# Patient Record
Sex: MALE | Race: WHITE | NOT HISPANIC OR LATINO | Employment: OTHER | URBAN - NONMETROPOLITAN AREA
[De-identification: names, ages, dates, MRNs, and addresses within clinical notes are randomized per-mention and may not be internally consistent; named-entity substitution may affect disease eponyms.]

---

## 2017-03-14 ENCOUNTER — TRANSCRIBE ORDERS (OUTPATIENT)
Dept: ADMINISTRATIVE | Facility: HOSPITAL | Age: 67
End: 2017-03-14

## 2017-03-14 DIAGNOSIS — R41.3 MEMORY LOSS: Primary | ICD-10-CM

## 2017-03-21 ENCOUNTER — APPOINTMENT (OUTPATIENT)
Dept: CT IMAGING | Facility: HOSPITAL | Age: 67
End: 2017-03-21
Attending: INTERNAL MEDICINE

## 2017-03-21 ENCOUNTER — HOSPITAL ENCOUNTER (OUTPATIENT)
Dept: CT IMAGING | Facility: HOSPITAL | Age: 67
Discharge: HOME | End: 2017-03-21
Attending: INTERNAL MEDICINE | Admitting: INTERNAL MEDICINE

## 2017-03-21 ENCOUNTER — HOSPITAL ENCOUNTER (OUTPATIENT)
Dept: CT IMAGING | Facility: HOSPITAL | Age: 67
Discharge: HOME | End: 2017-03-21
Attending: INTERNAL MEDICINE

## 2017-03-21 DIAGNOSIS — R41.3 MEMORY LOSS: ICD-10-CM

## 2017-03-21 PROCEDURE — 70450 CT HEAD/BRAIN W/O DYE: CPT

## 2017-05-23 ENCOUNTER — PREP FOR SURGERY (OUTPATIENT)
Dept: OTHER | Facility: HOSPITAL | Age: 67
End: 2017-05-23

## 2017-05-23 ENCOUNTER — OFFICE VISIT (OUTPATIENT)
Dept: GASTROENTEROLOGY | Facility: CLINIC | Age: 67
End: 2017-05-23

## 2017-05-23 VITALS
HEART RATE: 60 BPM | WEIGHT: 223 LBS | HEIGHT: 72 IN | SYSTOLIC BLOOD PRESSURE: 132 MMHG | DIASTOLIC BLOOD PRESSURE: 79 MMHG | BODY MASS INDEX: 30.2 KG/M2 | TEMPERATURE: 97.6 F | RESPIRATION RATE: 16 BRPM

## 2017-05-23 DIAGNOSIS — Z12.11 COLON CANCER SCREENING: Primary | ICD-10-CM

## 2017-05-23 PROCEDURE — S0260 H&P FOR SURGERY: HCPCS | Performed by: NURSE PRACTITIONER

## 2017-05-23 RX ORDER — LISINOPRIL 20 MG/1
20 TABLET ORAL DAILY
COMMUNITY

## 2017-05-23 RX ORDER — SIMVASTATIN 40 MG
40 TABLET ORAL NIGHTLY
COMMUNITY

## 2017-05-23 RX ORDER — MUPIROCIN CALCIUM 20 MG/G
1 CREAM TOPICAL 3 TIMES DAILY
COMMUNITY
End: 2019-08-22

## 2017-05-23 RX ORDER — NIACIN 1000 MG
1000 TABLET, EXTENDED RELEASE ORAL DAILY
COMMUNITY
End: 2019-08-22

## 2017-05-23 RX ORDER — SODIUM CHLORIDE 0.9 % (FLUSH) 0.9 %
1-10 SYRINGE (ML) INJECTION AS NEEDED
Status: CANCELLED | OUTPATIENT
Start: 2017-05-23

## 2017-05-23 RX ORDER — SULFAMETHOXAZOLE AND TRIMETHOPRIM 800; 160 MG/1; MG/1
1 TABLET ORAL 2 TIMES DAILY
COMMUNITY
End: 2017-06-12

## 2017-05-23 RX ORDER — CARVEDILOL 3.12 MG/1
3.12 TABLET ORAL 2 TIMES DAILY WITH MEALS
COMMUNITY

## 2017-05-23 RX ORDER — LEVOTHYROXINE SODIUM 0.12 MG/1
125 TABLET ORAL DAILY
COMMUNITY
End: 2019-08-29 | Stop reason: SDUPTHER

## 2017-05-23 RX ORDER — SODIUM CHLORIDE 9 MG/ML
70 INJECTION, SOLUTION INTRAVENOUS CONTINUOUS PRN
Status: CANCELLED | OUTPATIENT
Start: 2017-05-23

## 2017-05-23 RX ORDER — ASPIRIN 325 MG
325 TABLET, DELAYED RELEASE (ENTERIC COATED) ORAL DAILY
COMMUNITY
End: 2019-08-22

## 2017-06-15 ENCOUNTER — ANESTHESIA EVENT (OUTPATIENT)
Dept: GASTROENTEROLOGY | Facility: HOSPITAL | Age: 67
End: 2017-06-15

## 2017-06-15 ENCOUNTER — ANESTHESIA (OUTPATIENT)
Dept: GASTROENTEROLOGY | Facility: HOSPITAL | Age: 67
End: 2017-06-15

## 2017-06-15 ENCOUNTER — HOSPITAL ENCOUNTER (OUTPATIENT)
Facility: HOSPITAL | Age: 67
Setting detail: HOSPITAL OUTPATIENT SURGERY
Discharge: HOME | End: 2017-06-15
Attending: INTERNAL MEDICINE | Admitting: INTERNAL MEDICINE

## 2017-06-15 VITALS
OXYGEN SATURATION: 96 % | HEIGHT: 72 IN | SYSTOLIC BLOOD PRESSURE: 120 MMHG | TEMPERATURE: 97.4 F | RESPIRATION RATE: 18 BRPM | DIASTOLIC BLOOD PRESSURE: 79 MMHG | BODY MASS INDEX: 29.12 KG/M2 | HEART RATE: 50 BPM | WEIGHT: 215 LBS

## 2017-06-15 DIAGNOSIS — Z12.11 COLON CANCER SCREENING: ICD-10-CM

## 2017-06-15 PROCEDURE — 25010000002 MEPERIDINE PER 100 MG: Performed by: NURSE ANESTHETIST, CERTIFIED REGISTERED

## 2017-06-15 PROCEDURE — 25010000002 ONDANSETRON PER 1 MG: Performed by: NURSE ANESTHETIST, CERTIFIED REGISTERED

## 2017-06-15 PROCEDURE — 25010000002 PROPOFOL 1000 MG/100ML EMULSION: Performed by: NURSE ANESTHETIST, CERTIFIED REGISTERED

## 2017-06-15 PROCEDURE — 25010000002 MIDAZOLAM PER 1 MG: Performed by: NURSE ANESTHETIST, CERTIFIED REGISTERED

## 2017-06-15 PROCEDURE — 45380 COLONOSCOPY AND BIOPSY: CPT | Performed by: INTERNAL MEDICINE

## 2017-06-15 PROCEDURE — 88305 TISSUE EXAM BY PATHOLOGIST: CPT | Performed by: INTERNAL MEDICINE

## 2017-06-15 RX ORDER — SODIUM CHLORIDE 9 MG/ML
70 INJECTION, SOLUTION INTRAVENOUS CONTINUOUS PRN
Status: DISCONTINUED | OUTPATIENT
Start: 2017-06-15 | End: 2017-06-15 | Stop reason: HOSPADM

## 2017-06-15 RX ORDER — PROPOFOL 10 MG/ML
INJECTION, EMULSION INTRAVENOUS AS NEEDED
Status: DISCONTINUED | OUTPATIENT
Start: 2017-06-15 | End: 2017-06-15 | Stop reason: SURG

## 2017-06-15 RX ORDER — SODIUM CHLORIDE 0.9 % (FLUSH) 0.9 %
1-10 SYRINGE (ML) INJECTION AS NEEDED
Status: DISCONTINUED | OUTPATIENT
Start: 2017-06-15 | End: 2017-06-15 | Stop reason: HOSPADM

## 2017-06-15 RX ORDER — MIDAZOLAM HYDROCHLORIDE 1 MG/ML
INJECTION INTRAMUSCULAR; INTRAVENOUS AS NEEDED
Status: DISCONTINUED | OUTPATIENT
Start: 2017-06-15 | End: 2017-06-15 | Stop reason: SURG

## 2017-06-15 RX ORDER — ONDANSETRON 2 MG/ML
INJECTION INTRAMUSCULAR; INTRAVENOUS AS NEEDED
Status: DISCONTINUED | OUTPATIENT
Start: 2017-06-15 | End: 2017-06-15 | Stop reason: SURG

## 2017-06-15 RX ORDER — MEPERIDINE HYDROCHLORIDE 50 MG/ML
INJECTION INTRAMUSCULAR; INTRAVENOUS; SUBCUTANEOUS AS NEEDED
Status: DISCONTINUED | OUTPATIENT
Start: 2017-06-15 | End: 2017-06-15 | Stop reason: SURG

## 2017-06-15 RX ADMIN — SODIUM CHLORIDE 70 ML/HR: 9 INJECTION, SOLUTION INTRAVENOUS at 10:15

## 2017-06-15 RX ADMIN — MEPERIDINE HYDROCHLORIDE 50 MG: 50 INJECTION INTRAMUSCULAR; INTRAVENOUS; SUBCUTANEOUS at 13:24

## 2017-06-15 RX ADMIN — MIDAZOLAM HYDROCHLORIDE 2 MG: 1 INJECTION, SOLUTION INTRAMUSCULAR; INTRAVENOUS at 13:24

## 2017-06-15 RX ADMIN — PROPOFOL 50 MG: 10 INJECTION, EMULSION INTRAVENOUS at 13:25

## 2017-06-15 RX ADMIN — ONDANSETRON 4 MG: 2 INJECTION INTRAMUSCULAR; INTRAVENOUS at 13:24

## 2017-06-15 RX ADMIN — PROPOFOL 50 MG: 10 INJECTION, EMULSION INTRAVENOUS at 13:35

## 2017-06-15 RX ADMIN — PROPOFOL 50 MG: 10 INJECTION, EMULSION INTRAVENOUS at 13:45

## 2017-06-15 NOTE — DISCHARGE INSTRUCTIONS
Watch for abdominal pain, fever or chills, and nausea-vomiting.  If so to seek medical help.  The patient may call the office in 1 week regarding biopsy results.  However, the patient may follow-up in the office in 4 weeks if desired.   Follow-up colonoscopy perhaps in 10 years depending on the overall health status and desired ability.

## 2017-06-15 NOTE — ANESTHESIA PREPROCEDURE EVALUATION
Anesthesia Evaluation     Patient summary reviewed and Nursing notes reviewed   no history of anesthetic complications:  NPO Solid Status: > 8 hours  NPO Liquid Status: > 8 hours     Airway   Mallampati: I  TM distance: >3 FB  Neck ROM: full  no difficulty expected  Dental - normal exam     Pulmonary - normal exam    breath sounds clear to auscultation  (+) a smoker Former,     ROS comment: 13 pack yr history, quit 1982  Cardiovascular - normal exam    Patient on routine beta blocker and Beta blocker given within 24 hours of surgery  Rhythm: regular  Rate: normal    (+) hypertension well controlled, past MI  >12 months, CAD, cardiac stents more than 12 months ago       Neuro/Psych- negative ROS  GI/Hepatic/Renal/Endo    (+)  hypothyroidism,     Musculoskeletal (-) negative ROS    Abdominal    Substance History - negative use     OB/GYN negative ob/gyn ROS         Other - negative ROS                                     Anesthesia Plan    ASA 3     MAC   (Pt told that intravenous sedation will be used as the primary anesthetic. Every effort will be made to make sure the patient is comfortable.     The patient was told they may or may not have recall for the procedure.  Will proceed with the plan of care.)  intravenous induction   Anesthetic plan and risks discussed with patient.

## 2017-06-15 NOTE — PLAN OF CARE
Problem: GI Endoscopy (Adult)  Goal: Signs and Symptoms of Listed Potential Problems Will be Absent or Manageable (GI Endoscopy)  Outcome: Ongoing (interventions implemented as appropriate)    06/15/17 0870   GI Endoscopy   Problems Assessed (GI Endoscopy) all   Problems Present (GI Endoscopy) none

## 2017-06-15 NOTE — H&P (VIEW-ONLY)
"[address]  [facility] [address]    Chief Complaint   Patient presents with   • Colon Cancer Screening     History of Present Illness     The patient denies recent change in bowel habits. There is no diarrhea or constipation. There is no history of abdominal pain. There is no history of overt GI bleed (hematemesis melena or hematochezia). The patient denies nausea or vomiting. There is no history of reflux. The patient denies dysphagia or odynophagia. There is no history of recent significant weight loss. There is no history of liver disease in the past. There is a family history of colon cancer in the patient's cousin. The patient's last colonoscopy was in 2003 and was \"normal\" per patient.l    Review of Systems   Constitutional: Positive for fatigue. Negative for appetite change, chills, fever and unexpected weight change.   HENT: Negative for mouth sores, nosebleeds and trouble swallowing.    Eyes: Negative for discharge and redness.   Respiratory: Negative for apnea, cough and shortness of breath.    Cardiovascular: Negative for chest pain, palpitations and leg swelling.   Gastrointestinal: Negative for abdominal distention, abdominal pain, anal bleeding, blood in stool, constipation, diarrhea, nausea and vomiting.   Endocrine: Negative for cold intolerance, heat intolerance and polydipsia.   Genitourinary: Negative for dysuria, hematuria and urgency.   Musculoskeletal: Negative for arthralgias, joint swelling and myalgias.   Skin: Negative for rash.   Allergic/Immunologic: Negative for food allergies and immunocompromised state.   Neurological: Negative for dizziness, seizures, syncope and headaches.   Hematological: Negative for adenopathy. Does not bruise/bleed easily.   Psychiatric/Behavioral: Negative for dysphoric mood. The patient is not nervous/anxious and is not hyperactive.      Patient Active Problem List   Diagnosis   • Colon cancer screening     Past Medical History:   Diagnosis Date   • " "Coronary artery disease    • Heart attack 2014   • History of heart artery stent 2012   • Snores      Past Surgical History:   Procedure Laterality Date   • BACK SURGERY  1987   • COLONOSCOPY  2003   • TONSILLECTOMY  1950     Family History   Problem Relation Age of Onset   • Colon cancer Cousin      Social History   Substance Use Topics   • Smoking status: Former Smoker     Quit date: 1982   • Smokeless tobacco: Not on file   • Alcohol use No       Current Outpatient Prescriptions:   •  aspirin  MG tablet, Take 325 mg by mouth Every 6 (Six) Hours As Needed., Disp: , Rfl:   •  carvedilol (COREG) 3.125 MG tablet, Take 3.125 mg by mouth 2 (Two) Times a Day With Meals., Disp: , Rfl:   •  levothyroxine (SYNTHROID, LEVOTHROID) 125 MCG tablet, Take 125 mcg by mouth Daily., Disp: , Rfl:   •  lisinopril (PRINIVIL,ZESTRIL) 20 MG tablet, Take 20 mg by mouth Daily., Disp: , Rfl:   •  mupirocin (BACTROBAN) 2 % cream, Apply  topically 3 (Three) Times a Day., Disp: , Rfl:   •  Niacin ER 1000 MG tablet controlled-release, Take  by mouth., Disp: , Rfl:   •  simvastatin (ZOCOR) 40 MG tablet, Take 40 mg by mouth Every Night., Disp: , Rfl:   •  sulfamethoxazole-trimethoprim (BACTRIM DS,SEPTRA DS) 800-160 MG per tablet, Take 1 tablet by mouth 2 (Two) Times a Day., Disp: , Rfl:     No Known Allergies    /79  Pulse 60  Temp 97.6 °F (36.4 °C)  Resp 16  Ht 72\" (182.9 cm)  Wt 223 lb (101 kg)  BMI 30.24 kg/m2    Physical Exam   Constitutional: He is oriented to person, place, and time. He appears well-developed and well-nourished. No distress.   HENT:   Head: Normocephalic and atraumatic.   Right Ear: Hearing and external ear normal.   Left Ear: Hearing and external ear normal.   Nose: Nose normal.   Mouth/Throat: Oropharynx is clear and moist and mucous membranes are normal. Mucous membranes are not pale, not dry and not cyanotic. No oral lesions. No oropharyngeal exudate.   Eyes: Conjunctivae and EOM are normal. Right eye " "exhibits no discharge. Left eye exhibits no discharge.   Neck: Trachea normal. Neck supple. No JVD present. No edema present. No thyroid mass and no thyromegaly present.   Cardiovascular: Normal rate, regular rhythm, S2 normal and normal heart sounds.  Exam reveals no gallop, no S3 and no friction rub.    No murmur heard.  Pulmonary/Chest: Effort normal and breath sounds normal. No respiratory distress. He exhibits no tenderness.   Abdominal: Normal appearance and bowel sounds are normal. He exhibits no distension, no ascites and no mass. There is no splenomegaly or hepatomegaly. There is no tenderness. There is no rigidity, no rebound and no guarding. No hernia.       Vascular Status -  His exam exhibits no right foot edema. His exam exhibits no left foot edema.  Lymphadenopathy:     He has no cervical adenopathy.        Left: No supraclavicular adenopathy present.   Neurological: He is alert and oriented to person, place, and time. He has normal strength. No cranial nerve deficit or sensory deficit.   Skin: No rash noted. He is not diaphoretic. No cyanosis. No pallor. Nails show no clubbing.   Psychiatric: He has a normal mood and affect.   Nursing note and vitals reviewed.  Stigmata of chronic liver disease:  None.  Asterixis:  None.    Laboratory Tests:    Upon review of records:    Dated 3/16/2017 glucose 110 BUN 13 creatinine 0.99 sodium 140 potassium 4.4 chloride 107 CO2 26 calcium 9.1 albumin 4.0 total bilirubin 0.5 alkaline phosphatase 72 AST 24 ALT 18 WBC 6.7 hemoglobin 14.2 hematocrit 41.8 platelet count 253 MCV 90.4 TSH 0.73 vitamin D 38 vitamin B12 1056    Ramu was seen today for colon cancer screening.    Diagnoses and all orders for this visit:    Colon cancer screening  Comments:  Last colonoscopy was in 2003 and was \"normal\" per patient. There is a family history of colon cancer in the patient's cousin.        Plan  and Patient Instructions:  Patient Instructions   1. Colonoscopy: Description of " the procedure, risks, benefits, alternatives and options, including nonoperative options, were discussed with the patient in detail. The patient understands and wishes to proceed.    Patient Care Team:  Pérez Bernstein MD as PCP - General    ALBIN Sheridan

## 2017-06-15 NOTE — ANESTHESIA POSTPROCEDURE EVALUATION
Patient: Ramu Bustos    Procedure Summary     Date Anesthesia Start Anesthesia Stop Room / Location    06/15/17 4622 [address] [facility] 2 / [facility]       Procedure Diagnosis Surgeon Provider    COLONOSCOPY WITH BIOPSY   (N/A Anus) Colon cancer screening  (Colon cancer screening [Z12.11]) MD Giovanny Rogers CRNA          Anesthesia Type: MAC  Last vitals  BP      Temp      Pulse     Resp      SpO2        Post Anesthesia Care and Evaluation    Patient location during evaluation: bedside  Patient participation: complete - patient participated  Level of consciousness: awake and awake and alert  Pain score: 0  Pain management: satisfactory to patient  Airway patency: patent  Anesthetic complications: No anesthetic complications  PONV Status: none  Cardiovascular status: acceptable and stable  Respiratory status: acceptable, spontaneous ventilation, room air and nonlabored ventilation  Hydration status: acceptable

## 2017-06-15 NOTE — OP NOTE
PROCEDURE:  Colonoscopy to the terminal ileum with biopsies.    DATE OF PROCEDURE:  Nancy 15, 2017    REFERRING PROVIDER:  Pérez Bernstein MD     INSTRUMENT USED: Olympus PCF H 190 videocolonoscope.      INDICATIONS OF THE PROCEDURE:   This is a 67-year-old white male for colon cancer screening.  Last colonoscopy in 2003.     BIOPSIES:  Biopsies were obtained from the terminal ileum-nodularity with mild focal erythema.      PHOTOGRAPHS:  Photographs were included in the medical records.     MEDICATIONS:  MAC.       CONSENT/PREPROCEDURE EVALUATION:  Risks, benefits, alternatives and options of the procedure including risks of sedation/anesthesia were discussed with the patient and informed consent was obtained prior to the procedure.  History and physical examination were performed and nothing precluded the test.      REPORT:  The patient was placed in left lateral decubitus position and a digital examination was performed.  Once under the influence of IV sedation, the instrument was inserted into the rectum and advanced under direct vision to cecum which was identified by the ileocecal valve, triradiate folds and appendiceal orifice. The scope was then maneuvered into the terminal ileum.        FINDINGS:      Digital rectal examination:  Good anal tone.  No perianal pathology.  No mass.        Terminal ileum:  7-8 cm.  nodularity was noted with mild focal erythema.  Biopsies were obtained in this area.     Cecum and ascending colon: Normal.       Hepatic flexure, transverse colon, splenic flexure: Scant early diverticular change was noted in the left colon.  Normal.         Descending colon, sigmoid colon and rectum:  A retroflex examination within the rectum revealed internal hemorrhoids.        The scope was then straightened, the lower GI tract was decompressed, and the scope was pulled out of the patient.  The patient tolerated the procedure well.  There were no immediate complications and the patient was  transferred in stable condition for post procedure observation.      TECHNICAL DATA:   1. [place] prep score: 8 (3+2+3).     2. Difficulty of examination:  Average.     3. Withdrawal time: 8 min.    4. Retroflex examination in right colon: Yes.    5. Second look Rectum to cecum with decompression: Yes.    DIAGNOSES:    1. Scant early diverticular change in the left colon.  2. Increased nodularity within the terminal ileum which may represent underlying lymphoid hyperplasia.  Mild focal erythema.  3. Internal hemorrhoids.    RECOMMENDATIONS:     1. Follow biopsies.    2. Follow-up:      3. Followup colonoscopy in 10 years.     4. Dietary instructions.     Thank you very much for letting me participate in the care of this patient. Please do not hesitate to call me if you have any questions.

## 2017-06-20 LAB
LAB AP CASE REPORT: NORMAL
Lab: NORMAL
PATH REPORT.FINAL DX SPEC: NORMAL

## 2019-08-22 ENCOUNTER — OFFICE VISIT (OUTPATIENT)
Dept: INTERNAL MEDICINE | Facility: CLINIC | Age: 69
End: 2019-08-22

## 2019-08-22 ENCOUNTER — HOSPITAL ENCOUNTER (OUTPATIENT)
Dept: GENERAL RADIOLOGY | Facility: HOSPITAL | Age: 69
Discharge: HOME | End: 2019-08-22
Admitting: INTERNAL MEDICINE

## 2019-08-22 VITALS
SYSTOLIC BLOOD PRESSURE: 149 MMHG | OXYGEN SATURATION: 96 % | TEMPERATURE: 98.2 F | HEART RATE: 80 BPM | BODY MASS INDEX: 29.5 KG/M2 | WEIGHT: 217.8 LBS | HEIGHT: 72 IN | DIASTOLIC BLOOD PRESSURE: 87 MMHG

## 2019-08-22 DIAGNOSIS — R41.3 MEMORY LOSS: ICD-10-CM

## 2019-08-22 DIAGNOSIS — I10 HTN (HYPERTENSION), BENIGN: ICD-10-CM

## 2019-08-22 DIAGNOSIS — M79.10 MYALGIA: Primary | ICD-10-CM

## 2019-08-22 DIAGNOSIS — R73.9 HYPERGLYCEMIA: ICD-10-CM

## 2019-08-22 DIAGNOSIS — E78.2 MIXED HYPERLIPIDEMIA: ICD-10-CM

## 2019-08-22 DIAGNOSIS — E03.9 ACQUIRED HYPOTHYROIDISM: ICD-10-CM

## 2019-08-22 PROCEDURE — 99204 OFFICE O/P NEW MOD 45 MIN: CPT | Performed by: INTERNAL MEDICINE

## 2019-08-22 PROCEDURE — 71046 X-RAY EXAM CHEST 2 VIEWS: CPT

## 2019-08-22 RX ORDER — PREDNISONE 10 MG/1
10 TABLET ORAL DAILY
COMMUNITY
Start: 2019-08-20 | End: 2019-08-30

## 2019-08-22 RX ORDER — DICLOFENAC SODIUM 75 MG/1
75 TABLET, DELAYED RELEASE ORAL 2 TIMES DAILY
Refills: 0 | COMMUNITY
Start: 2019-08-20 | End: 2019-09-05

## 2019-08-22 RX ORDER — MEMANTINE HYDROCHLORIDE AND DONEPEZIL HYDROCHLORIDE 14; 10 MG/1; MG/1
CAPSULE ORAL
COMMUNITY
Start: 2019-05-30 | End: 2019-09-26 | Stop reason: DRUGHIGH

## 2019-08-22 RX ORDER — NIACIN 1000 MG
TABLET, EXTENDED RELEASE 24 HR ORAL
COMMUNITY
Start: 2019-07-07 | End: 2019-08-22 | Stop reason: SDUPTHER

## 2019-08-22 NOTE — PROGRESS NOTES
Subjective  Ramu Bustos is a 69 y.o. male    HPI 69-year-old male with a history of Alzheimer type dementia, coronary artery disease coming in for evaluation of bilateral shoulder pain which is ongoing for about 3 months.  The patient had come in alone and was a poor historian.  I was able to get some of the history subsequently by calling his wife who was at home taking care of a disabled son.  Has had complaints of bilateral shoulder pain with weakness in both his upper extremities without associated numbness or tingling.  He is able to  objects without difficulty.  He was diagnosed with Alzheimer type dementia about 2 years ago at  neurology.    The following portions of the patient's history were reviewed and updated as appropriate: allergies, current medications, past family history, past medical history, past social history, past surgical history, and problem list.     Review of Systems   Constitutional: Positive for fatigue. Negative for activity change, appetite change and fever.   HENT: Negative for congestion, ear discharge, ear pain and trouble swallowing.    Eyes: Negative for photophobia and visual disturbance.   Respiratory: Negative for cough and shortness of breath.    Cardiovascular: Negative for chest pain and palpitations.   Gastrointestinal: Negative for abdominal distention, abdominal pain, constipation, diarrhea, nausea and vomiting.   Endocrine: Negative.    Genitourinary: Negative for dysuria, hematuria and urgency.   Musculoskeletal: Positive for arthralgias and myalgias. Negative for back pain and joint swelling.   Skin: Negative for color change and rash.   Allergic/Immunologic: Negative.    Neurological: Negative for dizziness, weakness, light-headedness and headaches.   Hematological: Negative for adenopathy. Does not bruise/bleed easily.   Psychiatric/Behavioral: Positive for sleep disturbance. Negative for agitation, confusion and dysphoric mood. The patient is not  "nervous/anxious.        Visit Vitals  /87 Comment: Automatic   Pulse 80   Temp 98.2 °F (36.8 °C) (Temporal)   Ht 182.9 cm (72\")   Wt 98.8 kg (217 lb 12.8 oz)   SpO2 96%   BMI 29.54 kg/m²       Objective  Physical Exam   Constitutional: He is oriented to person, place, and time. He appears well-developed and well-nourished. No distress.   HENT:   Nose: Nose normal.   Mouth/Throat: Oropharynx is clear and moist.   Eyes: Conjunctivae and EOM are normal. No scleral icterus.   Neck: No tracheal deviation present. No thyromegaly present.   Cardiovascular: Normal rate and regular rhythm. Exam reveals no friction rub.   No murmur heard.  Pulmonary/Chest: No respiratory distress. He has no wheezes. He has no rales.   Abdominal: Soft. He exhibits no distension and no mass. There is no tenderness. There is no guarding.   Musculoskeletal: Normal range of motion. He exhibits no deformity.   Painful shoulder abduction bilaterally.  Some tenderness on palpation over his deltoid muscles   Lymphadenopathy:     He has no cervical adenopathy.   Neurological: He is alert and oriented to person, place, and time. He has normal reflexes. No cranial nerve deficit. Coordination normal.   Skin: Skin is warm and dry. No rash noted. No erythema.   Psychiatric: He has a normal mood and affect. His behavior is normal. Thought content normal.   Scored 25 out of 30 on Mini-Mental state exam       Diagnoses and all orders for this visit:    Myalgia with bilateral shoulder pain rule out polymyalgia rheumatica rule out statin induced myopathy check CPK C-reactive protein level along with CBC CMP check TSH level  He is on prednisone 10 mg daily.  May have to adjust dose according to lab data.  Discussed with wife she will be getting us lab work done by his previous internist.    Memory loss history of Alzheimer type dementia continue with Namzaric    Mixed hyperlipidemia will have him discontinue Niaspan continue with the statin for " now.    Other orders  -     diclofenac (VOLTAREN) 75 MG EC tablet; Take 75 mg by mouth 2 (Two) Times a Day.  -     Discontinue: NIASPAN 1000 MG CR tablet  -     NAMZARIC 14-10 MG capsule sustained-release 24 hr  -     predniSONE (DELTASONE) 10 MG tablet; Take 10 mg by mouth Daily.

## 2019-08-27 LAB
ALBUMIN SERPL-MCNC: 3.9 G/DL (ref 3.5–5.2)
ALBUMIN/GLOB SERPL: 1.6 G/DL
ALP SERPL-CCNC: 73 U/L (ref 39–117)
ALT SERPL-CCNC: 15 U/L (ref 1–41)
AST SERPL-CCNC: 17 U/L (ref 1–40)
BILIRUB SERPL-MCNC: 0.3 MG/DL (ref 0.2–1.2)
BUN SERPL-MCNC: 22 MG/DL (ref 8–23)
BUN/CREAT SERPL: 20 (ref 7–25)
CALCIUM SERPL-MCNC: 9.4 MG/DL (ref 8.6–10.5)
CHLORIDE SERPL-SCNC: 104 MMOL/L (ref 98–107)
CK SERPL-CCNC: 34 U/L (ref 20–200)
CO2 SERPL-SCNC: 27.2 MMOL/L (ref 22–29)
CREAT SERPL-MCNC: 1.1 MG/DL (ref 0.76–1.27)
CRP SERPL-MCNC: 0.04 MG/DL (ref 0–0.5)
ERYTHROCYTE [DISTWIDTH] IN BLOOD BY AUTOMATED COUNT: 13.3 % (ref 12.3–15.4)
ERYTHROCYTE [SEDIMENTATION RATE] IN BLOOD BY WESTERGREN METHOD: 5 MM/HR (ref 0–20)
GLOBULIN SER CALC-MCNC: 2.5 GM/DL
GLUCOSE SERPL-MCNC: 150 MG/DL (ref 65–99)
HBA1C MFR BLD: 5.9 % (ref 4.8–5.6)
HCT VFR BLD AUTO: 43.1 % (ref 37.5–51)
HGB BLD-MCNC: 13.4 G/DL (ref 13–17.7)
MCH RBC QN AUTO: 30.6 PG (ref 26.6–33)
MCHC RBC AUTO-ENTMCNC: 31.1 G/DL (ref 31.5–35.7)
MCV RBC AUTO: 98.4 FL (ref 79–97)
PLATELET # BLD AUTO: 369 10*3/MM3 (ref 140–450)
POTASSIUM SERPL-SCNC: 4.8 MMOL/L (ref 3.5–5.2)
PROT SERPL-MCNC: 6.4 G/DL (ref 6–8.5)
RBC # BLD AUTO: 4.38 10*6/MM3 (ref 4.14–5.8)
SODIUM SERPL-SCNC: 142 MMOL/L (ref 136–145)
T4 FREE SERPL-MCNC: 1.74 NG/DL (ref 0.93–1.7)
TSH SERPL DL<=0.005 MIU/L-ACNC: 2.5 MIU/ML (ref 0.27–4.2)
VIT B12 SERPL-MCNC: 555 PG/ML (ref 211–946)
WBC # BLD AUTO: 11.53 10*3/MM3 (ref 3.4–10.8)

## 2019-08-29 RX ORDER — LEVOTHYROXINE SODIUM 0.1 MG/1
100 TABLET ORAL DAILY
Qty: 90 TABLET | Refills: 3 | Status: SHIPPED | OUTPATIENT
Start: 2019-08-29 | End: 2019-11-13 | Stop reason: SDUPTHER

## 2019-09-05 ENCOUNTER — OFFICE VISIT (OUTPATIENT)
Dept: INTERNAL MEDICINE | Facility: CLINIC | Age: 69
End: 2019-09-05

## 2019-09-05 VITALS
TEMPERATURE: 97.4 F | OXYGEN SATURATION: 99 % | DIASTOLIC BLOOD PRESSURE: 84 MMHG | BODY MASS INDEX: 28.91 KG/M2 | HEIGHT: 72 IN | SYSTOLIC BLOOD PRESSURE: 131 MMHG | WEIGHT: 213.4 LBS | HEART RATE: 57 BPM

## 2019-09-05 DIAGNOSIS — M25.511 CHRONIC PAIN OF BOTH SHOULDERS: ICD-10-CM

## 2019-09-05 DIAGNOSIS — M25.512 CHRONIC PAIN OF BOTH SHOULDERS: ICD-10-CM

## 2019-09-05 DIAGNOSIS — E03.1 CONGENITAL HYPOTHYROIDISM WITHOUT GOITER: ICD-10-CM

## 2019-09-05 DIAGNOSIS — G89.29 CHRONIC PAIN OF BOTH SHOULDERS: ICD-10-CM

## 2019-09-05 DIAGNOSIS — Z23 NEED FOR VACCINATION: Primary | ICD-10-CM

## 2019-09-05 DIAGNOSIS — R41.3 MEMORY PROBLEM: ICD-10-CM

## 2019-09-05 PROCEDURE — 90653 IIV ADJUVANT VACCINE IM: CPT | Performed by: INTERNAL MEDICINE

## 2019-09-05 PROCEDURE — G0008 ADMIN INFLUENZA VIRUS VAC: HCPCS | Performed by: INTERNAL MEDICINE

## 2019-09-05 PROCEDURE — 99214 OFFICE O/P EST MOD 30 MIN: CPT | Performed by: INTERNAL MEDICINE

## 2019-09-05 PROCEDURE — 90670 PCV13 VACCINE IM: CPT | Performed by: INTERNAL MEDICINE

## 2019-09-05 PROCEDURE — G0009 ADMIN PNEUMOCOCCAL VACCINE: HCPCS | Performed by: INTERNAL MEDICINE

## 2019-09-05 NOTE — PROGRESS NOTES
"Subjective  Ramu Bustos is a 69 y.o. male    HPI f/u visit for bilateral shoulder pain that has improved slightly since last visit. The patient is a bad historian but is accompanied by wife who is helping give history. Problem has been ongoing since May. The pain dull ache that is worse in the mornings. Was taking prednisone but has been off of it for about a week now. The prednisone did help but started having pain in both shoulders 9/3 which he took Ibuprofen 800mg for and got good relief.     The following portions of the patient's history were reviewed and updated as appropriate: allergies, current medications, past family history, past medical history, past social history, past surgical history, and problem list.     Review of Systems   Constitutional: Negative for activity change and fatigue.   Eyes: Negative for visual disturbance.   Respiratory: Negative.    Cardiovascular: Negative.  Negative for chest pain.   Musculoskeletal: Positive for arthralgias. Negative for neck pain and neck stiffness.   Neurological: Negative for tremors, light-headedness and headaches.   Psychiatric/Behavioral:        Memory loss       Visit Vitals  /84 Comment: Automatic   Pulse 57   Temp 97.4 °F (36.3 °C) (Temporal)   Ht 182.9 cm (72\")   Wt 96.8 kg (213 lb 6.4 oz)   SpO2 99%   BMI 28.94 kg/m²       Objective  Physical Exam   Constitutional: He appears well-developed and well-nourished.   Eyes: Pupils are equal, round, and reactive to light.   Neck: Normal range of motion.   Cardiovascular: Normal rate, regular rhythm, normal heart sounds and intact distal pulses.   No murmur heard.  Pulmonary/Chest: Effort normal and breath sounds normal. No respiratory distress.   Abdominal: Soft. Bowel sounds are normal. He exhibits no distension. There is no tenderness.   Musculoskeletal: Normal range of motion. He exhibits tenderness. He exhibits no edema or deformity.        Right shoulder: He exhibits tenderness. He exhibits " normal range of motion, no swelling, no crepitus and normal strength.        Left shoulder: He exhibits tenderness. He exhibits normal range of motion, no swelling, no crepitus, no deformity and normal strength.   Neurological: He is alert. He has normal strength.   Skin: Skin is warm and dry.   Psychiatric: He has a normal mood and affect. Cognition and memory are impaired. He exhibits abnormal recent memory.   Nursing note and vitals reviewed.      Diagnoses and all orders for this visit:    Need for vaccination  -     Fluad Tri 65yr ([id number])  -     Pneumococcal Conjugate Vaccine 13-Valent All    Chronic pain of both shoulders  - Concerns for polymyalgia rheumatica. Previously taking prednisone, if symptoms return off of prednisone will escalate workup and recheck CRP and sed rate. No S/S of temporal arteritis. D/C Ibuprofen and instructed to take Tylenol PRN for pain.     Hypothyroidism  - Synthroid dose adjusted per previous increased free T4 value. Will recheck TSH and free T4 in 6 weeks.     Memory problem  - Followed by  who prescribes Namzaric, next appointment in October. No significant findings on previous lab work. Encouraged wife to attend appointment with patient.  Behaviorally stable  1.   This note accurately reflects work and decisions made by me.

## 2019-09-06 ENCOUNTER — TELEPHONE (OUTPATIENT)
Dept: INTERNAL MEDICINE | Facility: CLINIC | Age: 69
End: 2019-09-06

## 2019-09-06 DIAGNOSIS — M79.10 MYALGIA: Primary | ICD-10-CM

## 2019-09-06 RX ORDER — ASPIRIN 81 MG/1
81 TABLET, CHEWABLE ORAL DAILY
COMMUNITY
End: 2019-09-26

## 2019-09-06 NOTE — TELEPHONE ENCOUNTER
Pt called and said that he took Tylenol as suggested, but that it is not helping much with his shoulder and wrist pain. He also called to let us know that he is taking 81 mg ASA a day-he forgot to tell us.  Is that okay or does he need to stop.

## 2019-09-09 ENCOUNTER — LAB (OUTPATIENT)
Dept: LAB | Facility: HOSPITAL | Age: 69
End: 2019-09-09

## 2019-09-09 DIAGNOSIS — M79.10 MYALGIA: ICD-10-CM

## 2019-09-09 DIAGNOSIS — Z23 NEED FOR VACCINATION: Primary | ICD-10-CM

## 2019-09-09 LAB
CHROMATIN AB SERPL-ACNC: <10 IU/ML (ref 0–14)
CRP SERPL-MCNC: 0.53 MG/DL (ref 0–0.5)
DEPRECATED RDW RBC AUTO: 44.9 FL (ref 37–54)
ERYTHROCYTE [DISTWIDTH] IN BLOOD BY AUTOMATED COUNT: 12.9 % (ref 12.3–15.4)
HCT VFR BLD AUTO: 43.9 % (ref 37.5–51)
HGB BLD-MCNC: 13.9 G/DL (ref 13–17.7)
MCH RBC QN AUTO: 30.2 PG (ref 26.6–33)
MCHC RBC AUTO-ENTMCNC: 31.7 G/DL (ref 31.5–35.7)
MCV RBC AUTO: 95.2 FL (ref 79–97)
PLATELET # BLD AUTO: 270 10*3/MM3 (ref 140–450)
PMV BLD AUTO: 10.2 FL (ref 6–12)
RBC # BLD AUTO: 4.61 10*6/MM3 (ref 4.14–5.8)
WBC NRBC COR # BLD: 8.79 10*3/MM3 (ref 3.4–10.8)

## 2019-09-09 PROCEDURE — 85027 COMPLETE CBC AUTOMATED: CPT

## 2019-09-09 PROCEDURE — 36415 COLL VENOUS BLD VENIPUNCTURE: CPT

## 2019-09-09 PROCEDURE — 86431 RHEUMATOID FACTOR QUANT: CPT

## 2019-09-09 PROCEDURE — 86140 C-REACTIVE PROTEIN: CPT

## 2019-09-12 RX ORDER — PREDNISONE 10 MG/1
10 TABLET ORAL DAILY
Qty: 40 TABLET | Refills: 1 | Status: SHIPPED | OUTPATIENT
Start: 2019-09-12 | End: 2019-11-08

## 2019-09-13 ENCOUNTER — TELEPHONE (OUTPATIENT)
Dept: INTERNAL MEDICINE | Facility: CLINIC | Age: 69
End: 2019-09-13

## 2019-09-13 NOTE — TELEPHONE ENCOUNTER
Ramu has started the prednisone today, they are concerned about what is causing his pain. They are requesting a MRI.    After talking with Dr. Rizzo we will continue the prednisone and discuss MRI at his appointment on 9/26/19

## 2019-09-26 ENCOUNTER — OFFICE VISIT (OUTPATIENT)
Dept: INTERNAL MEDICINE | Facility: CLINIC | Age: 69
End: 2019-09-26

## 2019-09-26 VITALS
WEIGHT: 210.8 LBS | HEIGHT: 72 IN | OXYGEN SATURATION: 95 % | SYSTOLIC BLOOD PRESSURE: 108 MMHG | TEMPERATURE: 97.8 F | HEART RATE: 67 BPM | BODY MASS INDEX: 28.55 KG/M2 | DIASTOLIC BLOOD PRESSURE: 76 MMHG

## 2019-09-26 DIAGNOSIS — R73.9 HYPERGLYCEMIA: ICD-10-CM

## 2019-09-26 DIAGNOSIS — F02.80 EARLY ONSET ALZHEIMER'S DEMENTIA WITHOUT BEHAVIORAL DISTURBANCE (HCC): ICD-10-CM

## 2019-09-26 DIAGNOSIS — M35.3 POLYMYALGIA RHEUMATICA (HCC): Primary | ICD-10-CM

## 2019-09-26 DIAGNOSIS — G30.0 EARLY ONSET ALZHEIMER'S DEMENTIA WITHOUT BEHAVIORAL DISTURBANCE (HCC): ICD-10-CM

## 2019-09-26 LAB — HBA1C MFR BLD: 5.8 %

## 2019-09-26 PROCEDURE — 99214 OFFICE O/P EST MOD 30 MIN: CPT | Performed by: INTERNAL MEDICINE

## 2019-09-26 PROCEDURE — 83036 HEMOGLOBIN GLYCOSYLATED A1C: CPT | Performed by: INTERNAL MEDICINE

## 2019-09-26 RX ORDER — MEMANTINE HYDROCHLORIDE AND DONEPEZIL HYDROCHLORIDE 28; 10 MG/1; MG/1
CAPSULE ORAL
Refills: 0 | COMMUNITY
Start: 2019-09-06 | End: 2019-11-13 | Stop reason: SDUPTHER

## 2019-09-26 NOTE — PROGRESS NOTES
Coming in for follow-up has had complaints of upper subjective  Ramu HILARIA Bustos is a 69 y.o. male    HPI body fatigue with bilateral shoulder pain had responded well to prednisone in the past no new trauma after his prednisone was tapered off he had recurrence of his pain.  Repeat C-reactive protein level showed elevation again.  Now he is back on 20 mg dose of prednisone which was tapered down to 10 mg in a few weeks continues to have significant relief with his pain.  Denies upper extremity weakness numbness.  No visual disturbances or jaw claudication.  Has had hyperglycemia noted on routine labs    The following portions of the patient's history were reviewed and updated as appropriate: allergies, current medications, past family history, past medical history, past social history, past surgical history, and problem list.     Review of Systems   Constitutional: Negative.  Negative for activity change, appetite change, fatigue and fever.   HENT: Negative for congestion, ear discharge, ear pain and trouble swallowing.    Eyes: Negative for photophobia and visual disturbance.   Respiratory: Negative for cough and shortness of breath.    Cardiovascular: Negative for chest pain and palpitations.   Gastrointestinal: Negative for abdominal distention, abdominal pain, constipation, diarrhea, nausea and vomiting.   Endocrine: Negative.    Genitourinary: Negative for dysuria, hematuria and urgency.   Musculoskeletal: Positive for arthralgias. Negative for back pain, joint swelling and myalgias.   Skin: Negative for color change and rash.   Allergic/Immunologic: Negative.    Neurological: Negative for dizziness, weakness, light-headedness and headaches.   Hematological: Negative for adenopathy. Does not bruise/bleed easily.   Psychiatric/Behavioral: Positive for confusion and decreased concentration. Negative for agitation and dysphoric mood. The patient is not nervous/anxious.        Visit Vitals  /76 Comment:  "Automatic   Pulse 67   Temp 97.8 °F (36.6 °C) (Temporal)   Ht 182.9 cm (72\")   Wt 95.6 kg (210 lb 12.8 oz)   SpO2 95%   BMI 28.59 kg/m²       Objective  Physical Exam   Constitutional: He is oriented to person, place, and time. He appears well-developed and well-nourished. No distress.   HENT:   Nose: Nose normal.   Mouth/Throat: Oropharynx is clear and moist.   Eyes: Conjunctivae and EOM are normal. No scleral icterus.   Neck: No tracheal deviation present. No thyromegaly present.   Cardiovascular: Normal rate and regular rhythm. Exam reveals no friction rub.   No murmur heard.  Pulmonary/Chest: No respiratory distress. He has no wheezes. He has no rales.   Abdominal: Soft. He exhibits no distension and no mass. There is no tenderness. There is no guarding.   Musculoskeletal: Normal range of motion. He exhibits no deformity.   Lymphadenopathy:     He has no cervical adenopathy.   Neurological: He is alert and oriented to person, place, and time. He has normal reflexes. No cranial nerve deficit. Coordination normal.   Skin: Skin is warm and dry. No rash noted. No erythema.   Psychiatric: He has a normal mood and affect. His behavior is normal. Judgment and thought content normal.       Diagnoses and all orders for this visit:    Polymyalgia rheumatica (CMS/HCC) continue prednisone at 10 mg daily no associated visual disturbances    Hyperglycemia discussed dietary restrictions check A1c    Early onset Alzheimer's dementia without behavioral disturbance behaviorally stable however has significant memory impairment.  Has follow-up with [facility] continue with current meds    Other orders  -     NAMZARIC 28-10 MG capsule sustained-release 24 hr        "

## 2019-11-08 ENCOUNTER — OFFICE VISIT (OUTPATIENT)
Dept: INTERNAL MEDICINE | Facility: CLINIC | Age: 69
End: 2019-11-08

## 2019-11-08 VITALS
BODY MASS INDEX: 29.39 KG/M2 | RESPIRATION RATE: 16 BRPM | WEIGHT: 217 LBS | HEIGHT: 72 IN | SYSTOLIC BLOOD PRESSURE: 110 MMHG | DIASTOLIC BLOOD PRESSURE: 72 MMHG | TEMPERATURE: 96.7 F | HEART RATE: 74 BPM | OXYGEN SATURATION: 98 %

## 2019-11-08 DIAGNOSIS — M25.512 CHRONIC PAIN OF BOTH SHOULDERS: Primary | ICD-10-CM

## 2019-11-08 DIAGNOSIS — G89.29 CHRONIC PAIN OF BOTH SHOULDERS: Primary | ICD-10-CM

## 2019-11-08 DIAGNOSIS — M25.511 CHRONIC PAIN OF BOTH SHOULDERS: Primary | ICD-10-CM

## 2019-11-08 PROCEDURE — 99214 OFFICE O/P EST MOD 30 MIN: CPT | Performed by: NURSE PRACTITIONER

## 2019-11-08 RX ORDER — PREDNISONE 10 MG/1
10 TABLET ORAL DAILY
Qty: 40 TABLET | Refills: 0 | Status: SHIPPED | OUTPATIENT
Start: 2019-11-08

## 2019-11-08 RX ORDER — DICLOFENAC SODIUM 75 MG/1
75 TABLET, DELAYED RELEASE ORAL DAILY
COMMUNITY
Start: 2019-10-02 | End: 2019-11-08

## 2019-11-08 NOTE — PROGRESS NOTES
"Date: 2019    Name: Ramu Bustos  : 1950    Chief Complaint:   Chief Complaint   Patient presents with   • Shoulder Pain     bilateral       HPI:  Ramu Bustos is a 69 y.o. male presents with bilateral shoulder stiffness and pain. He has been treated for bilateral shoulder pain in the past, and polymyalgia rheumatica has been suspected.  He states both shoulders ache, with deep pain.  Rates pain currently at 6/10.  He states he is unable to fully rotate his arms/shoulders.  He states he has not taken any medication, used heat for shoulder pain.  Denies numbness, tingling, burning in shoulders or arms.  Denies back pain, has not been more physically active than normal.  Patient is not a good historian.    History:  The following portions of the patient's history were reviewed and updated as appropriate: allergies, current medications, past medical history, family history, surgical history, social history and problem list.     ROS:  Review of Systems   Constitutional: Negative for chills, fatigue and fever.   Respiratory: Negative for cough, shortness of breath and wheezing.    Cardiovascular: Negative for chest pain, palpitations and leg swelling.   Musculoskeletal: Negative for gait problem, joint swelling, neck pain and neck stiffness.   Skin: Negative for pallor and rash.       VS:  Vitals:    19 1420   BP: 110/72   Pulse: 74   Resp: 16   Temp: 96.7 °F (35.9 °C)   SpO2: 98%   Weight: 98.4 kg (217 lb)   Height: 182.9 cm (72\")   PainSc:   2     Body mass index is 29.43 kg/m².    PE:  Physical Exam   Constitutional: He is oriented to person, place, and time. He appears well-developed and well-nourished. No distress.   HENT:   Head: Normocephalic.   Mouth/Throat: Oropharynx is clear and moist.   Cardiovascular: Normal rate, regular rhythm, normal heart sounds and intact distal pulses.   Pulmonary/Chest: Effort normal and breath sounds normal.   Musculoskeletal:   Range of motion decreased in " bilateral shoulders, patient unable to raise arms past height of shoulders without pain.  He is able to perform Apley scratch test.  No decreased range of motion in elbows.  Shoulders are not tender to touch.  Bilateral radial and brachial pulses are normal.  No decrease in sensation in arms or shoulders.  No crepitus or swelling noted in shoulders.  Normal range of motion of neck without pain.   Neurological: He is alert and oriented to person, place, and time.       Assessment/Plan:  Ramu was seen today for shoulder pain.    Diagnoses and all orders for this visit:    Chronic pain of both shoulders  -     predniSONE (DELTASONE) 10 MG tablet; Take 1 tablet by mouth Daily. Take 2 tabs daily for 6 days then 1 tab daily  - Heat to shoulders, prn pain  - Rest shoulders and arm, as needed    Return if symptoms worsen or fail to improve.  Appointment scheduled for 11/18/19

## 2019-11-09 ENCOUNTER — OFFICE VISIT (OUTPATIENT)
Dept: INTERNAL MEDICINE | Facility: CLINIC | Age: 69
End: 2019-11-09

## 2019-11-09 VITALS
WEIGHT: 212 LBS | BODY MASS INDEX: 28.71 KG/M2 | TEMPERATURE: 97.6 F | HEART RATE: 69 BPM | OXYGEN SATURATION: 98 % | SYSTOLIC BLOOD PRESSURE: 126 MMHG | DIASTOLIC BLOOD PRESSURE: 77 MMHG | RESPIRATION RATE: 16 BRPM | HEIGHT: 72 IN

## 2019-11-09 DIAGNOSIS — G89.29 CHRONIC PAIN OF BOTH SHOULDERS: Primary | ICD-10-CM

## 2019-11-09 DIAGNOSIS — M25.512 CHRONIC PAIN OF BOTH SHOULDERS: Primary | ICD-10-CM

## 2019-11-09 DIAGNOSIS — M25.511 CHRONIC PAIN OF BOTH SHOULDERS: Primary | ICD-10-CM

## 2019-11-09 PROCEDURE — 99213 OFFICE O/P EST LOW 20 MIN: CPT | Performed by: INTERNAL MEDICINE

## 2019-11-09 NOTE — PROGRESS NOTES
"Subjective  Ramu Bustos is a 69 y.o. male    HPI patient with complains of bilateral shoulder pain with fatigue and weakness he is being treated for suspected polymyalgia rheumatica with prednisone.  Comes in today with severe pain in his right shoulder region denies change in activity no new trauma as not tried any other medication for this.  Has significant short-term memory deficit and is a poor historian.  He is coming alone today    The following portions of the patient's history were reviewed and updated as appropriate: allergies, current medications, past family history, past medical history, past social history, past surgical history, and problem list.     Review of Systems   Constitutional: Negative.  Negative for activity change, appetite change, fatigue and fever.   HENT: Negative for congestion, ear discharge, ear pain and trouble swallowing.    Eyes: Negative for photophobia and visual disturbance.   Respiratory: Negative for cough and shortness of breath.    Cardiovascular: Negative for chest pain and palpitations.   Gastrointestinal: Negative for abdominal distention, abdominal pain, constipation, diarrhea, nausea and vomiting.   Endocrine: Negative.    Genitourinary: Negative for dysuria, hematuria and urgency.   Musculoskeletal: Positive for arthralgias. Negative for back pain, joint swelling and myalgias.        Rt shoulder pain   Skin: Negative for color change and rash.   Allergic/Immunologic: Negative.    Neurological: Negative for dizziness, weakness, light-headedness and headaches.   Hematological: Negative for adenopathy. Does not bruise/bleed easily.   Psychiatric/Behavioral: Negative for agitation, confusion and dysphoric mood. The patient is not nervous/anxious.        Visit Vitals  /77 (BP Location: Left arm)   Pulse 69   Temp 97.6 °F (36.4 °C) (Oral)   Resp 16   Ht 182.9 cm (72\")   Wt 96.2 kg (212 lb)   SpO2 98%   BMI 28.75 kg/m²       Objective  Physical Exam   Constitutional: " He is oriented to person, place, and time. He appears well-developed and well-nourished. No distress.   HENT:   Nose: Nose normal.   Mouth/Throat: Oropharynx is clear and moist.   Eyes: Conjunctivae and EOM are normal. No scleral icterus.   Neck: No tracheal deviation present. No thyromegaly present.   Cardiovascular: Normal rate and regular rhythm. Exam reveals no friction rub.   No murmur heard.  Pulmonary/Chest: No respiratory distress. He has no wheezes. He has no rales.   Abdominal: Soft. He exhibits no distension and no mass. There is no tenderness. There is no guarding.   Musculoskeletal: Normal range of motion. He exhibits deformity.   Painful ext, abduction of both shoulders   Lymphadenopathy:     He has no cervical adenopathy.   Neurological: He is alert and oriented to person, place, and time. He has normal reflexes. No cranial nerve deficit. Coordination normal.   Skin: Skin is warm and dry. No rash noted. No erythema.   Psychiatric: He has a normal mood and affect. His behavior is normal. Judgment and thought content normal.       Diagnoses and all orders for this visit:    Chronic pain of both shoulders continue with current meds for now will increase prednisone dosing to 20 mg daily.  Referral to orthopedic for further evaluation rule out musculoskeletal causes..  Discussed plan with his wife over the phone

## 2019-11-11 DIAGNOSIS — M25.511 PAIN OF BOTH SHOULDER JOINTS: Primary | ICD-10-CM

## 2019-11-11 DIAGNOSIS — M25.512 PAIN OF BOTH SHOULDER JOINTS: Primary | ICD-10-CM

## 2019-11-12 ENCOUNTER — OFFICE VISIT (OUTPATIENT)
Dept: ORTHOPEDIC SURGERY | Facility: CLINIC | Age: 69
End: 2019-11-12

## 2019-11-12 VITALS — HEIGHT: 72 IN | RESPIRATION RATE: 18 BRPM | WEIGHT: 212 LBS | BODY MASS INDEX: 28.71 KG/M2

## 2019-11-12 DIAGNOSIS — M25.512 PAIN OF BOTH SHOULDER JOINTS: Primary | ICD-10-CM

## 2019-11-12 DIAGNOSIS — IMO0002 BURSITIS/TENDONITIS, SHOULDER: ICD-10-CM

## 2019-11-12 DIAGNOSIS — M25.511 PAIN OF BOTH SHOULDER JOINTS: Primary | ICD-10-CM

## 2019-11-12 PROCEDURE — 99203 OFFICE O/P NEW LOW 30 MIN: CPT | Performed by: ORTHOPAEDIC SURGERY

## 2019-11-12 RX ORDER — DICLOFENAC SODIUM 75 MG/1
75 TABLET, DELAYED RELEASE ORAL 2 TIMES DAILY
Qty: 60 TABLET | Refills: 2 | Status: SHIPPED | OUTPATIENT
Start: 2019-11-12

## 2019-11-12 NOTE — PROGRESS NOTES
Subjective   Patient ID: Ramu Bustos is a 69 y.o. 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  Denies neck pain or paresthesias or trauma to either shoulder has described no weakness or paresthesias or other joint arthralgias.  Has no history of rheumatoid arthritis.  Has been given several doses of prednisone short-term with minimal improvement has taken diclofenac in the past with minimal improvement as well.      Review of Systems   Constitutional: Negative for fever.   HENT: Negative for dental problem and voice change.    Eyes: Negative for visual disturbance.   Respiratory: Negative for shortness of breath.    Cardiovascular: Negative for chest pain.   Gastrointestinal: Negative for abdominal pain.   Genitourinary: Negative for dysuria.   Musculoskeletal: Positive for arthralgias. Negative for gait problem and joint swelling.   Skin: Negative for rash.   Neurological: Negative for speech difficulty.   Hematological: Does not bruise/bleed easily.   Psychiatric/Behavioral: Negative for confusion.       Past Medical History:   Diagnosis Date   • Coronary artery disease     REPORTS HAD 2 CORONARY STENTS PLACED   • Disease of thyroid gland    • Fracture     HAND (UNSURE SIDE)   • Heart attack (CMS/HCC) 2014   • History of heart artery stent 2012   • Hypertension    • Snores    • Wears glasses         Past Surgical History:   Procedure Laterality Date   • BACK SURGERY  1987   • CARDIAC SURGERY  2014    CARDIAC CATH WITH STENT PLACEMENT X2   • COLONOSCOPY  2003   • COLONOSCOPY N/A 6/15/2017    Procedure: COLONOSCOPY WITH  "BIOPSY  ;  Surgeon: Christiano Armando MD;  Location: [facility] ENDOSCOPY;  Service:    • TONSILLECTOMY  1950       Family History   Problem Relation Age of Onset   • Colon cancer Cousin    • No Known Problems Mother    • Heart attack Father        Social History     Socioeconomic History   • Marital status:      Spouse name: Not on file   • Number of children: Not on file   • Years of education: Not on file   • Highest education level: Not on file   Tobacco Use   • Smoking status: Former Smoker     Packs/day: 1.00     Years: 13.00     Pack years: 13.00     Types: Cigarettes     Last attempt to quit:      Years since quittin.8   • Smokeless tobacco: Never Used   Substance and Sexual Activity   • Alcohol use: No   • Drug use: No   • Sexual activity: Defer       I have reviewed the above medical and surgical history, family history, social history, medications, allergies and review of systems.    No Known Allergies      Current Outpatient Medications:   •  carvedilol (COREG) 3.125 MG tablet, Take 3.125 mg by mouth 2 (Two) Times a Day With Meals., Disp: , Rfl:   •  diclofenac (VOLTAREN) 75 MG EC tablet, Take 1 tablet by mouth 2 (Two) Times a Day., Disp: 60 tablet, Rfl: 2  •  levothyroxine (SYNTHROID, LEVOTHROID) 100 MCG tablet, Take 1 tablet by mouth Daily., Disp: 90 tablet, Rfl: 3  •  lisinopril (PRINIVIL,ZESTRIL) 20 MG tablet, Take 20 mg by mouth Daily., Disp: , Rfl:   •  NAMZARIC 28-10 MG capsule sustained-release 24 hr, , Disp: , Rfl: 0  •  predniSONE (DELTASONE) 10 MG tablet, Take 1 tablet by mouth Daily. Take 2 tabs daily for 6 days then 1 tab daily, Disp: 40 tablet, Rfl: 0  •  simvastatin (ZOCOR) 40 MG tablet, Take 40 mg by mouth Every Night., Disp: , Rfl:     Objective   Resp 18   Ht 182.9 cm (72\")   Wt 96.2 kg (212 lb)   BMI 28.75 kg/m²    Physical Exam  Constitutional: Patient is oriented to person, place, and time. Patient appears well-developed and well-nourished.   HENT:Head: Normocephalic and " atraumatic.   Eyes: EOM are normal. Pupils are equal, round, and reactive to light.   Neck: Normal range of motion. Neck supple.   Cardiovascular: Normal rate.    Pulmonary/Chest: Effort normal and breath sounds normal.   Abdominal: Soft.   Neurological: Patient is alert and oriented to person, place, and time.   Skin: Skin is warm and dry.   Psychiatric: Patient has a normal mood and affect.   Nursing note and vitals reviewed.       [unfilled]   Right shoulder: No atrophy full forward elevation negative impingement sign very minimal loss of internal/external rotation with minimal pain full strength throughout    Left shoulder: No atrophy slight loss of forward flexion of 10 degrees impingement sign positive for mechanical popping but negative for pain no focal weakness normal sensation throughout.    Cervical spine: No focal tenderness paraspinal spasm or Spurling findings.    Assessment/Plan   Review of Radiographic Studies:    Bilateral shoulder x-rays confirm minimal glenohumeral osteoarthritis in the right shoulder and minimal impingement of the left shoulder      Procedures     Ramu was seen today for pain and pain.    Diagnoses and all orders for this visit:    Pain of both shoulder joints  -     Ambulatory Referral to Physical Therapy Evaluate and treat    Bursitis/tendonitis, shoulder    Other orders  -     diclofenac (VOLTAREN) 75 MG EC tablet; Take 1 tablet by mouth 2 (Two) Times a Day.       Physical therapy referral given      Recommendations/Plan:   Exercise, medications, injections, other patient advice, and return appointment as noted.    Patient agreeable to call or return sooner for any concerns.             Impression:  Right dominant shoulder with minimal glenohumeral osteoarthritis, left shoulder with minimal subacromial impingement  Plan:  Therapy referral home exercise recheck in 6 weeks if not improving consider subacromial steroid injection and/or MRI, taper off prednisone and resume  rebecca

## 2019-11-13 ENCOUNTER — TELEPHONE (OUTPATIENT)
Dept: INTERNAL MEDICINE | Facility: CLINIC | Age: 69
End: 2019-11-13

## 2019-11-13 RX ORDER — MEMANTINE HYDROCHLORIDE AND DONEPEZIL HYDROCHLORIDE 28; 10 MG/1; MG/1
1 CAPSULE ORAL DAILY
Qty: 30 CAPSULE | Refills: 0 | Status: SHIPPED | OUTPATIENT
Start: 2019-11-13 | End: 2019-11-13 | Stop reason: SDUPTHER

## 2019-11-13 RX ORDER — LEVOTHYROXINE SODIUM 0.1 MG/1
100 TABLET ORAL DAILY
Qty: 90 TABLET | Refills: 3 | Status: SHIPPED | OUTPATIENT
Start: 2019-11-13 | End: 2019-12-18 | Stop reason: SDUPTHER

## 2019-11-13 RX ORDER — LEVOTHYROXINE SODIUM 0.1 MG/1
100 TABLET ORAL DAILY
Qty: 30 TABLET | Refills: 0 | Status: SHIPPED | OUTPATIENT
Start: 2019-11-13 | End: 2019-11-13 | Stop reason: SDUPTHER

## 2019-11-13 RX ORDER — MEMANTINE HYDROCHLORIDE AND DONEPEZIL HYDROCHLORIDE 28; 10 MG/1; MG/1
1 CAPSULE ORAL DAILY
Qty: 90 CAPSULE | Refills: 3 | Status: SHIPPED | OUTPATIENT
Start: 2019-11-13

## 2019-11-13 NOTE — TELEPHONE ENCOUNTER
Wife of Patient calling to report patient is completely out of 2 medications:    Levothyroxine   NAMZARIC    Patient requests refills be called into [facility] instead of the local pharmacy.

## 2019-11-13 NOTE — TELEPHONE ENCOUNTER
Called wife, Katerin, back to get directions on Namzaric because this was last sent by Dr Pabon and we did not have directions. Also verified with Katerin that since they are out of these medications I am sending 1 month to [facility] in [place] and then sending the regular supply to [facility].

## 2019-12-10 ENCOUNTER — OFFICE VISIT (OUTPATIENT)
Dept: ORTHOPEDIC SURGERY | Facility: CLINIC | Age: 69
End: 2019-12-10

## 2019-12-10 VITALS — RESPIRATION RATE: 18 BRPM | HEIGHT: 72 IN | WEIGHT: 212 LBS | BODY MASS INDEX: 28.71 KG/M2

## 2019-12-10 DIAGNOSIS — M19.011 PRIMARY LOCALIZED OSTEOARTHROSIS OF RIGHT SHOULDER REGION: Primary | ICD-10-CM

## 2019-12-10 DIAGNOSIS — IMO0002 BURSITIS/TENDONITIS, SHOULDER: ICD-10-CM

## 2019-12-10 PROCEDURE — 99214 OFFICE O/P EST MOD 30 MIN: CPT | Performed by: ORTHOPAEDIC SURGERY

## 2019-12-10 PROCEDURE — 20610 DRAIN/INJ JOINT/BURSA W/O US: CPT | Performed by: ORTHOPAEDIC SURGERY

## 2019-12-10 RX ORDER — LIDOCAINE HYDROCHLORIDE 10 MG/ML
2 INJECTION, SOLUTION INFILTRATION; PERINEURAL
Status: COMPLETED | OUTPATIENT
Start: 2019-12-10 | End: 2019-12-10

## 2019-12-10 RX ORDER — TRIAMCINOLONE ACETONIDE 40 MG/ML
40 INJECTION, SUSPENSION INTRA-ARTICULAR; INTRAMUSCULAR
Status: COMPLETED | OUTPATIENT
Start: 2019-12-10 | End: 2019-12-10

## 2019-12-10 RX ADMIN — LIDOCAINE HYDROCHLORIDE 2 ML: 10 INJECTION, SOLUTION INFILTRATION; PERINEURAL at 15:36

## 2019-12-10 RX ADMIN — TRIAMCINOLONE ACETONIDE 40 MG: 40 INJECTION, SUSPENSION INTRA-ARTICULAR; INTRAMUSCULAR at 15:36

## 2019-12-10 NOTE — PROGRESS NOTES
Subjective   Patient ID: Ramu Bustos is a 69 y.o. male  Follow-up and Pain of the Left Shoulder (Patient is here today for increased shoulder pain, he states the pain is minor on the right and the left is more severe.) and Follow-up and Pain of the Right Shoulder             History of Present Illness  Right-hand-dominant male who complains of increasing left shoulder pain with use no fall or injury recently since his last visit he is considered treatment options would like to pursue further care for his left shoulder.  His right shoulder pain is stable minimal to moderate not limiting his activities are keeping him awake he would rather not pursue further treatment for his right shoulder at this time.      Review of Systems   Constitutional: Negative for fever.   HENT: Negative for dental problem and voice change.    Eyes: Negative for visual disturbance.   Respiratory: Negative for shortness of breath.    Cardiovascular: Negative for chest pain.   Gastrointestinal: Negative for abdominal pain.   Genitourinary: Negative for dysuria.   Musculoskeletal: Positive for arthralgias. Negative for gait problem and joint swelling.   Skin: Negative for rash.   Neurological: Negative for speech difficulty.   Hematological: Does not bruise/bleed easily.   Psychiatric/Behavioral: Negative for confusion.       Past Medical History:   Diagnosis Date   • Coronary artery disease     REPORTS HAD 2 CORONARY STENTS PLACED   • Disease of thyroid gland    • Fracture     HAND (UNSURE SIDE)   • Heart attack (CMS/HCC) 2014   • History of heart artery stent 2012   • Hypertension    • Snores    • Wears glasses         Past Surgical History:   Procedure Laterality Date   • BACK SURGERY  1987   • CARDIAC SURGERY  2014    CARDIAC CATH WITH STENT PLACEMENT X2   • COLONOSCOPY  2003   • COLONOSCOPY N/A 6/15/2017    Procedure: COLONOSCOPY WITH BIOPSY  ;  Surgeon: Christiano Armando MD;  Location: [facility] ENDOSCOPY;  Service:    • TONSILLECTOMY  1950  "      Family History   Problem Relation Age of Onset   • Colon cancer Cousin    • No Known Problems Mother    • Heart attack Father        Social History     Socioeconomic History   • Marital status:      Spouse name: Not on file   • Number of children: Not on file   • Years of education: Not on file   • Highest education level: Not on file   Tobacco Use   • Smoking status: Former Smoker     Packs/day: 1.00     Years: 13.00     Pack years: 13.00     Types: Cigarettes     Last attempt to quit:      Years since quittin.9   • Smokeless tobacco: Never Used   Substance and Sexual Activity   • Alcohol use: No   • Drug use: No   • Sexual activity: Defer       I have reviewed the above medical and surgical history, family history, social history, medications, allergies and review of systems.    No Known Allergies      Current Outpatient Medications:   •  carvedilol (COREG) 3.125 MG tablet, Take 3.125 mg by mouth 2 (Two) Times a Day With Meals., Disp: , Rfl:   •  diclofenac (VOLTAREN) 75 MG EC tablet, Take 1 tablet by mouth 2 (Two) Times a Day., Disp: 60 tablet, Rfl: 2  •  levothyroxine (SYNTHROID, LEVOTHROID) 100 MCG tablet, Take 1 tablet by mouth Daily., Disp: 90 tablet, Rfl: 3  •  lisinopril (PRINIVIL,ZESTRIL) 20 MG tablet, Take 20 mg by mouth Daily., Disp: , Rfl:   •  NAMZARIC 28-10 MG capsule sustained-release 24 hr, Take 28 mg by mouth Daily., Disp: 90 capsule, Rfl: 3  •  predniSONE (DELTASONE) 10 MG tablet, Take 1 tablet by mouth Daily. Take 2 tabs daily for 6 days then 1 tab daily, Disp: 40 tablet, Rfl: 0  •  simvastatin (ZOCOR) 40 MG tablet, Take 40 mg by mouth Every Night., Disp: , Rfl:     Objective   Resp 18   Ht 182.9 cm (72\")   Wt 96.2 kg (212 lb)   BMI 28.75 kg/m²    Physical Exam  Constitutional: Patient is oriented to person, place, and time. Patient appears well-developed and well-nourished.   HENT:Head: Normocephalic and atraumatic.   Eyes: EOM are normal. Pupils are equal, round, and " reactive to light.   Neck: Normal range of motion. Neck supple.   Cardiovascular: Normal rate.    Pulmonary/Chest: Effort normal and breath sounds normal.   Abdominal: Soft.   Neurological: Patient is alert and oriented to person, place, and time.   Skin: Skin is warm and dry.   Psychiatric: Patient has a normal mood and affect.   Nursing note and vitals reviewed.       [unfilled]   Left shoulder: Point tenderness anterolateral acromial area minimal tenderness AC joint no erythema ecchymosis contusions or abrasions noted.  Forward elevation limited to 50 degrees active abduction only 60 degrees with pain anterolateral acromial area, slight tenderness with extension which is full internal rotation thumb to the greater trochanter cross body adduction is positive for pain at the AC joint.  Rotator cuff resistance limited exam secondary to pain.    Assessment/Plan   Review of Radiographic Studies:    No new imaging done today.      Large Joint Arthrocentesis: L subacromial bursa  Date/Time: 12/10/2019 3:36 PM  Consent given by: patient  Site marked: site marked  Timeout: Immediately prior to procedure a time out was called to verify the correct patient, procedure, equipment, support staff and site/side marked as required   Supporting Documentation  Indications: pain   Procedure Details  Location: shoulder - L subacromial bursa  Preparation: Patient was prepped and draped in the usual sterile fashion  Needle size: 22 G  Approach: posterior  Medications administered: 40 mg triamcinolone acetonide 40 MG/ML; 2 mL lidocaine 1 %  Patient tolerance: patient tolerated the procedure well with no immediate complications           Ramu was seen today for follow-up, pain, follow-up and pain.    Diagnoses and all orders for this visit:    Primary localized osteoarthrosis of right shoulder region    Bursitis/tendonitis, shoulder       MR left shoulder ordered      Recommendations/Plan:   Exercise, medications, injections, other  patient advice, and return appointment as noted.    Patient agreeable to call or return sooner for any concerns.             Impression:  Left nondominant shoulder subacromial bursitis rule out rotator cuff tear  Plan:  MR left shoulder therapy referral recheck  after imaging complete

## 2019-12-18 RX ORDER — LEVOTHYROXINE SODIUM 0.1 MG/1
100 TABLET ORAL DAILY
Qty: 90 TABLET | Refills: 3 | Status: SHIPPED | OUTPATIENT
Start: 2019-12-18

## 2019-12-30 ENCOUNTER — HOSPITAL ENCOUNTER (OUTPATIENT)
Dept: MRI IMAGING | Facility: HOSPITAL | Age: 69
Discharge: HOME | End: 2019-12-30
Admitting: ORTHOPAEDIC SURGERY

## 2019-12-30 PROCEDURE — 73221 MRI JOINT UPR EXTREM W/O DYE: CPT

## 2020-01-02 ENCOUNTER — OFFICE VISIT (OUTPATIENT)
Dept: ORTHOPEDIC SURGERY | Facility: CLINIC | Age: 70
End: 2020-01-02

## 2020-01-02 VITALS — WEIGHT: 212 LBS | RESPIRATION RATE: 18 BRPM | HEIGHT: 72 IN | BODY MASS INDEX: 28.71 KG/M2

## 2020-01-02 DIAGNOSIS — IMO0002 BURSITIS/TENDONITIS, SHOULDER: Primary | ICD-10-CM

## 2020-01-02 DIAGNOSIS — M19.012 PRIMARY LOCALIZED OSTEOARTHROSIS OF LEFT SHOULDER REGION: ICD-10-CM

## 2020-01-02 PROCEDURE — 99213 OFFICE O/P EST LOW 20 MIN: CPT | Performed by: ORTHOPAEDIC SURGERY

## 2020-01-02 NOTE — PROGRESS NOTES
Subjective   Patient ID: Ramu Bustos is a 69 y.o. male  Follow-up and Pain of the Left Shoulder (Patient is here today for MRI results.)             History of Present Illness  He returns status post injection for his left shoulder had an MRI which showed minor impingement bursitis findings and AC arthrosis.  Currently has no pain full functional range of motion no night pain and no restriction in daily activities      Review of Systems   Constitutional: Negative for fever.   HENT: Negative for dental problem and voice change.    Eyes: Negative for visual disturbance.   Respiratory: Negative for shortness of breath.    Cardiovascular: Negative for chest pain.   Gastrointestinal: Negative for abdominal pain.   Genitourinary: Negative for dysuria.   Musculoskeletal: Positive for arthralgias. Negative for gait problem and joint swelling.   Skin: Negative for rash.   Neurological: Negative for speech difficulty.   Hematological: Does not bruise/bleed easily.   Psychiatric/Behavioral: Negative for confusion.       Past Medical History:   Diagnosis Date   • Coronary artery disease     REPORTS HAD 2 CORONARY STENTS PLACED   • Disease of thyroid gland    • Fracture     HAND (UNSURE SIDE)   • Heart attack (CMS/HCC) 2014   • History of heart artery stent 2012   • Hypertension    • Snores    • Wears glasses         Past Surgical History:   Procedure Laterality Date   • BACK SURGERY  1987   • CARDIAC SURGERY  2014    CARDIAC CATH WITH STENT PLACEMENT X2   • COLONOSCOPY  2003   • COLONOSCOPY N/A 6/15/2017    Procedure: COLONOSCOPY WITH BIOPSY  ;  Surgeon: Christiano Armando MD;  Location: [facility] ENDOSCOPY;  Service:    • TONSILLECTOMY  1950       Family History   Problem Relation Age of Onset   • Colon cancer Cousin    • No Known Problems Mother    • Heart attack Father        Social History     Socioeconomic History   • Marital status:      Spouse name: Not on file   • Number of children: Not on file   • Years of  "education: Not on file   • Highest education level: Not on file   Tobacco Use   • Smoking status: Former Smoker     Packs/day: 1.00     Years: 13.00     Pack years: 13.00     Types: Cigarettes     Last attempt to quit: 1982     Years since quittin.0   • Smokeless tobacco: Never Used   Substance and Sexual Activity   • Alcohol use: No   • Drug use: No   • Sexual activity: Defer       I have reviewed the above medical and surgical history, family history, social history, medications, allergies and review of systems.    No Known Allergies      Current Outpatient Medications:   •  carvedilol (COREG) 3.125 MG tablet, Take 3.125 mg by mouth 2 (Two) Times a Day With Meals., Disp: , Rfl:   •  diclofenac (VOLTAREN) 75 MG EC tablet, Take 1 tablet by mouth 2 (Two) Times a Day., Disp: 60 tablet, Rfl: 2  •  levothyroxine (SYNTHROID, LEVOTHROID) 100 MCG tablet, Take 1 tablet by mouth Daily., Disp: 90 tablet, Rfl: 3  •  lisinopril (PRINIVIL,ZESTRIL) 20 MG tablet, Take 20 mg by mouth Daily., Disp: , Rfl:   •  NAMZARIC 28-10 MG capsule sustained-release 24 hr, Take 28 mg by mouth Daily., Disp: 90 capsule, Rfl: 3  •  predniSONE (DELTASONE) 10 MG tablet, Take 1 tablet by mouth Daily. Take 2 tabs daily for 6 days then 1 tab daily, Disp: 40 tablet, Rfl: 0  •  simvastatin (ZOCOR) 40 MG tablet, Take 40 mg by mouth Every Night., Disp: , Rfl:     Objective   Resp 18   Ht 182.9 cm (72\")   Wt 96.2 kg (212 lb)   BMI 28.75 kg/m²    Physical Exam  Constitutional: Patient is oriented to person, place, and time. Patient appears well-developed and well-nourished.   HENT:Head: Normocephalic and atraumatic.   Eyes: EOM are normal. Pupils are equal, round, and reactive to light.   Neck: Normal range of motion. Neck supple.   Cardiovascular: Normal rate.    Pulmonary/Chest: Effort normal and breath sounds normal.   Abdominal: Soft.   Neurological: Patient is alert and oriented to person, place, and time.   Skin: Skin is warm and dry. "   Psychiatric: Patient has a normal mood and affect.   Nursing note and vitals reviewed.       [unfilled]   Left shoulder: Full painless range of motion no focal weakness instability or signs of residual impingement neurovascularly intact    Assessment/Plan   Review of Radiographic Studies:    No new imaging done today.  MR left shoulder images directly examined and demonstrated to the patient AC arthrosis subacromial bursitis findings      Procedures     Ramu was seen today for follow-up and pain.    Diagnoses and all orders for this visit:    Bursitis/tendonitis, shoulder    Primary localized osteoarthrosis of left shoulder region       Orthopedic activities reviewed and patient expressed appreciation and Using illustrations and models, the nature of the pathology was explained to the patient      Recommendations/Plan:   Work/Activity Status: May perform usual activities as tolerated    Patient agreeable to call or return sooner for any concerns.         Discussed and reviewed indications for further treatment surgical decompression options and recommendations for home exercise    Impression:  Resolved left shoulder pain subacromial bursitis AC arthrosis no full-thickness tear on rotator cuff with recent MR  Plan:  Return PRN

## 2021-03-11 ENCOUNTER — IMMUNIZATION (OUTPATIENT)
Dept: VACCINE CLINIC | Facility: HOSPITAL | Age: 71
End: 2021-03-11

## 2021-03-11 PROCEDURE — 0001A: CPT | Performed by: INTERNAL MEDICINE

## 2021-03-11 PROCEDURE — 91300 HC SARSCOV02 VAC 30MCG/0.3ML IM: CPT | Performed by: INTERNAL MEDICINE

## 2021-04-01 ENCOUNTER — IMMUNIZATION (OUTPATIENT)
Dept: VACCINE CLINIC | Facility: HOSPITAL | Age: 71
End: 2021-04-01

## 2021-04-01 PROCEDURE — 0002A: CPT | Performed by: INTERNAL MEDICINE

## 2021-04-01 PROCEDURE — 91300 HC SARSCOV02 VAC 30MCG/0.3ML IM: CPT | Performed by: INTERNAL MEDICINE

## 2023-08-29 ENCOUNTER — HOSPITAL ENCOUNTER (EMERGENCY)
Facility: HOSPITAL | Age: 73
Discharge: HOME | End: 2023-08-30
Attending: STUDENT IN AN ORGANIZED HEALTH CARE EDUCATION/TRAINING PROGRAM
Payer: MEDICARE

## 2023-08-29 ENCOUNTER — APPOINTMENT (OUTPATIENT)
Dept: GENERAL RADIOLOGY | Facility: HOSPITAL | Age: 73
End: 2023-08-29
Payer: MEDICARE

## 2023-08-29 DIAGNOSIS — U07.1 COVID-19: Primary | ICD-10-CM

## 2023-08-29 LAB
ALBUMIN SERPL-MCNC: 4 G/DL (ref 3.5–5.2)
ALBUMIN/GLOB SERPL: 1.4 G/DL
ALP SERPL-CCNC: 110 U/L (ref 39–117)
ALT SERPL W P-5'-P-CCNC: 10 U/L (ref 1–41)
ANION GAP SERPL CALCULATED.3IONS-SCNC: 10.8 MMOL/L (ref 5–15)
AST SERPL-CCNC: 20 U/L (ref 1–40)
BASOPHILS # BLD AUTO: 0.07 10*3/MM3 (ref 0–0.2)
BASOPHILS NFR BLD AUTO: 0.7 % (ref 0–1.5)
BILIRUB SERPL-MCNC: 0.5 MG/DL (ref 0–1.2)
BUN SERPL-MCNC: 16 MG/DL (ref 8–23)
BUN/CREAT SERPL: 16.2 (ref 7–25)
CALCIUM SPEC-SCNC: 9.1 MG/DL (ref 8.6–10.5)
CHLORIDE SERPL-SCNC: 103 MMOL/L (ref 98–107)
CO2 SERPL-SCNC: 24.2 MMOL/L (ref 22–29)
CREAT SERPL-MCNC: 0.99 MG/DL (ref 0.76–1.27)
DEPRECATED RDW RBC AUTO: 40.5 FL (ref 37–54)
EGFRCR SERPLBLD CKD-EPI 2021: 79.4 ML/MIN/1.73
EOSINOPHIL # BLD AUTO: 0.11 10*3/MM3 (ref 0–0.4)
EOSINOPHIL NFR BLD AUTO: 1.1 % (ref 0.3–6.2)
ERYTHROCYTE [DISTWIDTH] IN BLOOD BY AUTOMATED COUNT: 12.4 % (ref 12.3–15.4)
FLUAV SUBTYP SPEC NAA+PROBE: NOT DETECTED
FLUBV RNA ISLT QL NAA+PROBE: NOT DETECTED
GLOBULIN UR ELPH-MCNC: 2.8 GM/DL
GLUCOSE SERPL-MCNC: 118 MG/DL (ref 65–99)
HCT VFR BLD AUTO: 43.8 % (ref 37.5–51)
HGB BLD-MCNC: 14.9 G/DL (ref 13–17.7)
IMM GRANULOCYTES # BLD AUTO: 0.05 10*3/MM3 (ref 0–0.05)
IMM GRANULOCYTES NFR BLD AUTO: 0.5 % (ref 0–0.5)
LYMPHOCYTES # BLD AUTO: 0.87 10*3/MM3 (ref 0.7–3.1)
LYMPHOCYTES NFR BLD AUTO: 8.7 % (ref 19.6–45.3)
MAGNESIUM SERPL-MCNC: 2 MG/DL (ref 1.6–2.4)
MCH RBC QN AUTO: 30.2 PG (ref 26.6–33)
MCHC RBC AUTO-ENTMCNC: 34 G/DL (ref 31.5–35.7)
MCV RBC AUTO: 88.7 FL (ref 79–97)
MONOCYTES # BLD AUTO: 0.87 10*3/MM3 (ref 0.1–0.9)
MONOCYTES NFR BLD AUTO: 8.7 % (ref 5–12)
NEUTROPHILS NFR BLD AUTO: 7.99 10*3/MM3 (ref 1.7–7)
NEUTROPHILS NFR BLD AUTO: 80.3 % (ref 42.7–76)
NRBC BLD AUTO-RTO: 0 /100 WBC (ref 0–0.2)
PLATELET # BLD AUTO: 243 10*3/MM3 (ref 140–450)
PMV BLD AUTO: 9.6 FL (ref 6–12)
POTASSIUM SERPL-SCNC: 3.9 MMOL/L (ref 3.5–5.2)
PROT SERPL-MCNC: 6.8 G/DL (ref 6–8.5)
RBC # BLD AUTO: 4.94 10*6/MM3 (ref 4.14–5.8)
SARS-COV-2 RNA RESP QL NAA+PROBE: DETECTED
SODIUM SERPL-SCNC: 138 MMOL/L (ref 136–145)
TROPONIN T SERPL HS-MCNC: 19 NG/L
WBC NRBC COR # BLD: 9.96 10*3/MM3 (ref 3.4–10.8)

## 2023-08-29 PROCEDURE — 83735 ASSAY OF MAGNESIUM: CPT

## 2023-08-29 PROCEDURE — 80053 COMPREHEN METABOLIC PANEL: CPT

## 2023-08-29 PROCEDURE — 85025 COMPLETE CBC W/AUTO DIFF WBC: CPT

## 2023-08-29 PROCEDURE — 71045 X-RAY EXAM CHEST 1 VIEW: CPT

## 2023-08-29 PROCEDURE — 99284 EMERGENCY DEPT VISIT MOD MDM: CPT

## 2023-08-29 PROCEDURE — 84484 ASSAY OF TROPONIN QUANT: CPT | Performed by: STUDENT IN AN ORGANIZED HEALTH CARE EDUCATION/TRAINING PROGRAM

## 2023-08-29 PROCEDURE — 87636 SARSCOV2 & INF A&B AMP PRB: CPT | Performed by: STUDENT IN AN ORGANIZED HEALTH CARE EDUCATION/TRAINING PROGRAM

## 2023-08-29 PROCEDURE — 93005 ELECTROCARDIOGRAM TRACING: CPT

## 2023-08-29 PROCEDURE — 93005 ELECTROCARDIOGRAM TRACING: CPT | Performed by: STUDENT IN AN ORGANIZED HEALTH CARE EDUCATION/TRAINING PROGRAM

## 2023-08-29 RX ORDER — SODIUM CHLORIDE 0.9 % (FLUSH) 0.9 %
10 SYRINGE (ML) INJECTION AS NEEDED
Status: DISCONTINUED | OUTPATIENT
Start: 2023-08-29 | End: 2023-08-30 | Stop reason: HOSPADM

## 2023-08-29 NOTE — Clinical Note
[facility] EMERGENCY DEPARTMENT  [address] [address]  Phone: [phone]    Ramu Bustos was seen and treated in our emergency department on 8/29/2023.  He may return to work on 09/01/2023.         Thank you for choosing [facility].    Cristian Anaya MD

## 2023-08-30 ENCOUNTER — APPOINTMENT (OUTPATIENT)
Dept: CT IMAGING | Facility: HOSPITAL | Age: 73
End: 2023-08-30
Payer: MEDICARE

## 2023-08-30 ENCOUNTER — HOSPITAL ENCOUNTER (OUTPATIENT)
Facility: HOSPITAL | Age: 73
Setting detail: OBSERVATION
LOS: 1 days | Discharge: HOME HEALTH | End: 2023-09-01
Attending: EMERGENCY MEDICINE | Admitting: INTERNAL MEDICINE
Payer: MEDICARE

## 2023-08-30 ENCOUNTER — APPOINTMENT (OUTPATIENT)
Dept: MRI IMAGING | Facility: HOSPITAL | Age: 73
End: 2023-08-30
Payer: MEDICARE

## 2023-08-30 VITALS
SYSTOLIC BLOOD PRESSURE: 132 MMHG | BODY MASS INDEX: 25.84 KG/M2 | WEIGHT: 195 LBS | HEART RATE: 102 BPM | HEIGHT: 73 IN | DIASTOLIC BLOOD PRESSURE: 87 MMHG | OXYGEN SATURATION: 94 % | TEMPERATURE: 98.7 F | RESPIRATION RATE: 18 BRPM

## 2023-08-30 DIAGNOSIS — U07.1 COVID-19: ICD-10-CM

## 2023-08-30 DIAGNOSIS — R26.9 GAIT ABNORMALITY: Primary | ICD-10-CM

## 2023-08-30 DIAGNOSIS — R53.1 GENERAL WEAKNESS: ICD-10-CM

## 2023-08-30 DIAGNOSIS — R29.6 REPEATED FALLS: ICD-10-CM

## 2023-08-30 DIAGNOSIS — Z74.1 REQUIRES ASSISTANCE WITH ACTIVITIES OF DAILY LIVING (ADL): ICD-10-CM

## 2023-08-30 PROBLEM — E78.5 HYPERLIPIDEMIA: Status: ACTIVE | Noted: 2023-08-30

## 2023-08-30 PROBLEM — F03.90 DEMENTIA WITHOUT BEHAVIORAL DISTURBANCE: Status: ACTIVE | Noted: 2023-08-30

## 2023-08-30 LAB
ALBUMIN SERPL-MCNC: 4 G/DL (ref 3.5–5.2)
ALBUMIN/GLOB SERPL: 1.3 G/DL
ALP SERPL-CCNC: 107 U/L (ref 39–117)
ALT SERPL W P-5'-P-CCNC: 10 U/L (ref 1–41)
ANION GAP SERPL CALCULATED.3IONS-SCNC: 12.2 MMOL/L (ref 5–15)
AST SERPL-CCNC: 18 U/L (ref 1–40)
BASOPHILS # BLD AUTO: 0.06 10*3/MM3 (ref 0–0.2)
BASOPHILS NFR BLD AUTO: 0.7 % (ref 0–1.5)
BILIRUB SERPL-MCNC: 0.7 MG/DL (ref 0–1.2)
BILIRUB UR QL STRIP: NEGATIVE
BUN SERPL-MCNC: 16 MG/DL (ref 8–23)
BUN/CREAT SERPL: 14.5 (ref 7–25)
CALCIUM SPEC-SCNC: 9.1 MG/DL (ref 8.6–10.5)
CHLORIDE SERPL-SCNC: 101 MMOL/L (ref 98–107)
CLARITY UR: CLEAR
CO2 SERPL-SCNC: 24.8 MMOL/L (ref 22–29)
COLOR UR: YELLOW
CREAT SERPL-MCNC: 1.1 MG/DL (ref 0.76–1.27)
DEPRECATED RDW RBC AUTO: 41.1 FL (ref 37–54)
EGFRCR SERPLBLD CKD-EPI 2021: 70 ML/MIN/1.73
EOSINOPHIL # BLD AUTO: 0.06 10*3/MM3 (ref 0–0.4)
EOSINOPHIL NFR BLD AUTO: 0.7 % (ref 0.3–6.2)
ERYTHROCYTE [DISTWIDTH] IN BLOOD BY AUTOMATED COUNT: 12.6 % (ref 12.3–15.4)
GLOBULIN UR ELPH-MCNC: 3 GM/DL
GLUCOSE SERPL-MCNC: 103 MG/DL (ref 65–99)
GLUCOSE UR STRIP-MCNC: NEGATIVE MG/DL
HCT VFR BLD AUTO: 43.2 % (ref 37.5–51)
HGB BLD-MCNC: 14.6 G/DL (ref 13–17.7)
HGB UR QL STRIP.AUTO: NEGATIVE
HOLD SPECIMEN: NORMAL
IMM GRANULOCYTES # BLD AUTO: 0.03 10*3/MM3 (ref 0–0.05)
IMM GRANULOCYTES NFR BLD AUTO: 0.4 % (ref 0–0.5)
KETONES UR QL STRIP: ABNORMAL
LEUKOCYTE ESTERASE UR QL STRIP.AUTO: NEGATIVE
LIPASE SERPL-CCNC: 38 U/L (ref 13–60)
LYMPHOCYTES # BLD AUTO: 1.05 10*3/MM3 (ref 0.7–3.1)
LYMPHOCYTES NFR BLD AUTO: 12.5 % (ref 19.6–45.3)
MCH RBC QN AUTO: 30.1 PG (ref 26.6–33)
MCHC RBC AUTO-ENTMCNC: 33.8 G/DL (ref 31.5–35.7)
MCV RBC AUTO: 89.1 FL (ref 79–97)
MONOCYTES # BLD AUTO: 1.14 10*3/MM3 (ref 0.1–0.9)
MONOCYTES NFR BLD AUTO: 13.6 % (ref 5–12)
NEUTROPHILS NFR BLD AUTO: 6.06 10*3/MM3 (ref 1.7–7)
NEUTROPHILS NFR BLD AUTO: 72.1 % (ref 42.7–76)
NITRITE UR QL STRIP: NEGATIVE
NRBC BLD AUTO-RTO: 0 /100 WBC (ref 0–0.2)
PH UR STRIP.AUTO: 5.5 [PH] (ref 5–8)
PLATELET # BLD AUTO: 229 10*3/MM3 (ref 140–450)
PMV BLD AUTO: 9.6 FL (ref 6–12)
POTASSIUM SERPL-SCNC: 3.8 MMOL/L (ref 3.5–5.2)
PROT SERPL-MCNC: 7 G/DL (ref 6–8.5)
PROT UR QL STRIP: NEGATIVE
RBC # BLD AUTO: 4.85 10*6/MM3 (ref 4.14–5.8)
SODIUM SERPL-SCNC: 138 MMOL/L (ref 136–145)
SP GR UR STRIP: 1.02 (ref 1–1.03)
T4 FREE SERPL-MCNC: 1.6 NG/DL (ref 0.93–1.7)
TROPONIN T SERPL HS-MCNC: 19 NG/L
UROBILINOGEN UR QL STRIP: ABNORMAL
WBC NRBC COR # BLD: 8.4 10*3/MM3 (ref 3.4–10.8)
WHOLE BLOOD HOLD COAG: NORMAL
WHOLE BLOOD HOLD COAG: NORMAL
WHOLE BLOOD HOLD SPECIMEN: NORMAL
WHOLE BLOOD HOLD SPECIMEN: NORMAL

## 2023-08-30 PROCEDURE — 93005 ELECTROCARDIOGRAM TRACING: CPT | Performed by: EMERGENCY MEDICINE

## 2023-08-30 PROCEDURE — 85025 COMPLETE CBC W/AUTO DIFF WBC: CPT | Performed by: EMERGENCY MEDICINE

## 2023-08-30 PROCEDURE — 70551 MRI BRAIN STEM W/O DYE: CPT

## 2023-08-30 PROCEDURE — 84484 ASSAY OF TROPONIN QUANT: CPT | Performed by: EMERGENCY MEDICINE

## 2023-08-30 PROCEDURE — G0378 HOSPITAL OBSERVATION PER HR: HCPCS

## 2023-08-30 PROCEDURE — 81003 URINALYSIS AUTO W/O SCOPE: CPT | Performed by: EMERGENCY MEDICINE

## 2023-08-30 PROCEDURE — 84439 ASSAY OF FREE THYROXINE: CPT | Performed by: INTERNAL MEDICINE

## 2023-08-30 PROCEDURE — 83690 ASSAY OF LIPASE: CPT | Performed by: EMERGENCY MEDICINE

## 2023-08-30 PROCEDURE — 80053 COMPREHEN METABOLIC PANEL: CPT | Performed by: EMERGENCY MEDICINE

## 2023-08-30 PROCEDURE — 70450 CT HEAD/BRAIN W/O DYE: CPT

## 2023-08-30 PROCEDURE — 99284 EMERGENCY DEPT VISIT MOD MDM: CPT

## 2023-08-30 PROCEDURE — 72125 CT NECK SPINE W/O DYE: CPT

## 2023-08-30 RX ORDER — ACETAMINOPHEN 325 MG/1
650 TABLET ORAL EVERY 6 HOURS PRN
Status: DISCONTINUED | OUTPATIENT
Start: 2023-08-30 | End: 2023-09-01 | Stop reason: HOSPADM

## 2023-08-30 RX ORDER — DONEPEZIL HYDROCHLORIDE 5 MG/1
10 TABLET, FILM COATED ORAL NIGHTLY
Status: DISCONTINUED | OUTPATIENT
Start: 2023-08-30 | End: 2023-09-01 | Stop reason: HOSPADM

## 2023-08-30 RX ORDER — SODIUM CHLORIDE 0.9 % (FLUSH) 0.9 %
10 SYRINGE (ML) INJECTION AS NEEDED
Status: DISCONTINUED | OUTPATIENT
Start: 2023-08-30 | End: 2023-09-01 | Stop reason: HOSPADM

## 2023-08-30 RX ORDER — MELOXICAM 15 MG/1
15 TABLET ORAL DAILY
COMMUNITY
End: 2023-09-01 | Stop reason: HOSPADM

## 2023-08-30 RX ORDER — SODIUM CHLORIDE 0.9 % (FLUSH) 0.9 %
10 SYRINGE (ML) INJECTION EVERY 12 HOURS SCHEDULED
Status: DISCONTINUED | OUTPATIENT
Start: 2023-08-30 | End: 2023-09-01 | Stop reason: HOSPADM

## 2023-08-30 RX ORDER — ATORVASTATIN CALCIUM 20 MG/1
20 TABLET, FILM COATED ORAL DAILY
Status: DISCONTINUED | OUTPATIENT
Start: 2023-08-30 | End: 2023-09-01 | Stop reason: HOSPADM

## 2023-08-30 RX ORDER — NITROGLYCERIN 0.4 MG/1
0.4 TABLET SUBLINGUAL
Status: DISCONTINUED | OUTPATIENT
Start: 2023-08-30 | End: 2023-09-01 | Stop reason: HOSPADM

## 2023-08-30 RX ORDER — MEMANTINE HYDROCHLORIDE 5 MG/1
10 TABLET ORAL DAILY
Status: DISCONTINUED | OUTPATIENT
Start: 2023-08-30 | End: 2023-09-01 | Stop reason: HOSPADM

## 2023-08-30 RX ADMIN — ATORVASTATIN CALCIUM 20 MG: 20 TABLET, FILM COATED ORAL at 18:32

## 2023-08-30 RX ADMIN — MEMANTINE 10 MG: 5 TABLET ORAL at 18:31

## 2023-08-30 RX ADMIN — DONEPEZIL HYDROCHLORIDE 10 MG: 5 TABLET, FILM COATED ORAL at 21:53

## 2023-08-30 RX ADMIN — Medication 10 ML: at 21:54

## 2023-08-30 RX ADMIN — LEVOTHYROXINE SODIUM 175 MCG: 150 TABLET ORAL at 18:32

## 2023-08-30 RX ADMIN — ACETAMINOPHEN 650 MG: 325 TABLET, FILM COATED ORAL at 16:41

## 2023-08-30 NOTE — ED PROVIDER NOTES
Subjective:  History of Present Illness:    Patient is a 73-year-old male with history of CAD, Alzheimer's disease, hypertension who presents today with increased weakness.  Per family at bedside, patient's son has COVID-19 at home.  Has been in contact with the patient.  Over the last 24 hours patient has become increasingly weak.  Has suffered several falls which is abnormal for the patient.  Family was concerned for possible worsening of his neurologic process and concern for the possibility of new stroke.  They present to our emergency department for evaluation.  On my exam, patient endorsed only diffuse weakness.  No focal findings.  Denies any persistent headache or visual change after his falls.  States that they were both mechanical.  Denies any neck pain on exam.  No chest pain or shortness of breath.  Denies any abdominal pain, nausea, vomiting, diarrhea.  Patient has been behaving to his baseline otherwise.      Nurses Notes reviewed and agree, including vitals, allergies, social history and prior medical history.     REVIEW OF SYSTEMS: All systems reviewed and not pertinent unless noted.  Review of Systems   Constitutional:  Negative for activity change, appetite change, chills, fatigue and fever.   HENT:  Negative for congestion, sinus pressure, sneezing and trouble swallowing.    Eyes:  Negative for discharge and itching.   Respiratory:  Negative for cough and shortness of breath.    Cardiovascular:  Negative for chest pain and palpitations.   Gastrointestinal:  Negative for abdominal distention and abdominal pain.   Endocrine: Negative for cold intolerance and heat intolerance.   Genitourinary:  Negative for decreased urine volume, dysuria and urgency.   Musculoskeletal:  Negative for gait problem, neck pain and neck stiffness.   Skin:  Negative for color change and rash.   Allergic/Immunologic: Negative for immunocompromised state.   Neurological:  Positive for weakness. Negative for facial asymmetry  "and headaches.   Hematological:  Negative for adenopathy.   Psychiatric/Behavioral:  Negative for self-injury and suicidal ideas.      Past Medical History:   Diagnosis Date    Coronary artery disease     REPORTS HAD 2 CORONARY STENTS PLACED    Disease of thyroid gland     Fracture     HAND (UNSURE SIDE)    Heart attack     History of heart artery stent     Hypertension     Snores     Wears glasses        Allergies:    Patient has no known allergies.      Past Surgical History:   Procedure Laterality Date    BACK SURGERY      CARDIAC SURGERY      CARDIAC CATH WITH STENT PLACEMENT X2    COLONOSCOPY      COLONOSCOPY N/A 6/15/2017    Procedure: COLONOSCOPY WITH BIOPSY  ;  Surgeon: Christiano Armando MD;  Location: [facility] ENDOSCOPY;  Service:     TONSILLECTOMY  1952         Social History     Socioeconomic History    Marital status:    Tobacco Use    Smoking status: Former     Packs/day: 1.00     Years: 13.00     Pack years: 13.00     Types: Cigarettes     Quit date:      Years since quittin.6    Smokeless tobacco: Never   Substance and Sexual Activity    Alcohol use: No    Drug use: No    Sexual activity: Defer         Family History   Problem Relation Age of Onset    Colon cancer Cousin     No Known Problems Mother     Heart attack Father        Objective  Physical Exam:  /87   Pulse 102   Temp 98.7 °F (37.1 °C) (Oral)   Resp 18   Ht 185.4 cm (73\")   Wt 88.5 kg (195 lb)   SpO2 94%   BMI 25.73 kg/m²      Physical Exam  Constitutional:       General: He is not in acute distress.     Appearance: Normal appearance. He is normal weight. He is not ill-appearing.   HENT:      Head: Normocephalic and atraumatic.      Nose: Nose normal. No congestion or rhinorrhea.      Mouth/Throat:      Mouth: Mucous membranes are moist.      Pharynx: Oropharynx is clear.   Eyes:      Extraocular Movements: Extraocular movements intact.      Conjunctiva/sclera: Conjunctivae normal.      Pupils: " Pupils are equal, round, and reactive to light.   Cardiovascular:      Rate and Rhythm: Normal rate and regular rhythm.      Pulses: Normal pulses.   Pulmonary:      Effort: Pulmonary effort is normal. No respiratory distress.      Breath sounds: Normal breath sounds.   Abdominal:      General: Abdomen is flat. Bowel sounds are normal. There is no distension.      Palpations: Abdomen is soft.      Tenderness: There is no abdominal tenderness.   Musculoskeletal:         General: No swelling or tenderness. Normal range of motion.      Cervical back: Normal range of motion and neck supple. No rigidity or tenderness.   Skin:     General: Skin is warm and dry.      Capillary Refill: Capillary refill takes less than 2 seconds.   Neurological:      General: No focal deficit present.      Mental Status: He is alert. Mental status is at baseline.      Cranial Nerves: No cranial nerve deficit.      Sensory: No sensory deficit.      Motor: No weakness.      Coordination: Coordination abnormal.      Comments: Patient alert to self and place which is the patient's baseline.  No focal weakness noted.  Patient does have shuffling gait consistent with known neurologic diagnoses per her son at bedside   Psychiatric:         Mood and Affect: Mood normal.         Behavior: Behavior normal.         Thought Content: Thought content normal.         Judgment: Judgment normal.       Procedures    ED Course:    ED Course as of 08/30/23 0738   Tue Aug 29, 2023   2341 EKG interpreted by me, normal sinus rhythm with left anterior fascicular block, no concerning ST changes noted, rate of 98 [JE]      ED Course User Index  [JE] Cristian Anaya MD       Lab Results (last 24 hours)       Procedure Component Value Units Date/Time    CBC & Differential [[id number]]  (Abnormal) Collected: 08/29/23 2303    Specimen: Blood Updated: 08/29/23 2314    Narrative:      The following orders were created for panel order CBC & Differential.  Procedure                                Abnormality         Status                     ---------                               -----------         ------                     CBC Auto Differential[[id number]]        Abnormal            Final result                 Please view results for these tests on the individual orders.    Comprehensive Metabolic Panel [[id number]]  (Abnormal) Collected: 08/29/23 2303    Specimen: Blood Updated: 08/29/23 2330     Glucose 118 mg/dL      BUN 16 mg/dL      Creatinine 0.99 mg/dL      Sodium 138 mmol/L      Potassium 3.9 mmol/L      Chloride 103 mmol/L      CO2 24.2 mmol/L      Calcium 9.1 mg/dL      Total Protein 6.8 g/dL      Albumin 4.0 g/dL      ALT (SGPT) 10 U/L      AST (SGOT) 20 U/L      Alkaline Phosphatase 110 U/L      Total Bilirubin 0.5 mg/dL      Globulin 2.8 gm/dL      A/G Ratio 1.4 g/dL      BUN/Creatinine Ratio 16.2     Anion Gap 10.8 mmol/L      eGFR 79.4 mL/min/1.73     Narrative:      GFR Normal >60  Chronic Kidney Disease <60  Kidney Failure <15    The GFR formula is only valid for adults with stable renal function between ages 18 and 70.    Single High Sensitivity Troponin T [[id number]]  (Abnormal) Collected: 08/29/23 2303    Specimen: Blood Updated: 08/29/23 2333     HS Troponin T 19 ng/L     Narrative:      High Sensitive Troponin T Reference Range:  <10.0 ng/L- Negative Female for AMI  <15.0 ng/L- Negative Male for AMI  >=10 - Abnormal Female indicating possible myocardial injury.  >=15 - Abnormal Male indicating possible myocardial injury.   Clinicians would have to utilize clinical acumen, EKG, Troponin, and serial changes to determine if it is an Acute Myocardial Infarction or myocardial injury due to an underlying chronic condition.         Magnesium [[id number]]  (Normal) Collected: 08/29/23 2303    Specimen: Blood Updated: 08/29/23 2330     Magnesium 2.0 mg/dL     CBC Auto Differential [[id number]]  (Abnormal) Collected: 08/29/23 2303    Specimen: Blood Updated: 08/29/23  2314     WBC 9.96 10*3/mm3      RBC 4.94 10*6/mm3      Hemoglobin 14.9 g/dL      Hematocrit 43.8 %      MCV 88.7 fL      MCH 30.2 pg      MCHC 34.0 g/dL      RDW 12.4 %      RDW-SD 40.5 fl      MPV 9.6 fL      Platelets 243 10*3/mm3      Neutrophil % 80.3 %      Lymphocyte % 8.7 %      Monocyte % 8.7 %      Eosinophil % 1.1 %      Basophil % 0.7 %      Immature Grans % 0.5 %      Neutrophils, Absolute 7.99 10*3/mm3      Lymphocytes, Absolute 0.87 10*3/mm3      Monocytes, Absolute 0.87 10*3/mm3      Eosinophils, Absolute 0.11 10*3/mm3      Basophils, Absolute 0.07 10*3/mm3      Immature Grans, Absolute 0.05 10*3/mm3      nRBC 0.0 /100 WBC     COVID-19 and FLU A/B PCR - Swab, Nasopharynx [[id number]]  (Abnormal) Collected: 08/29/23 2303    Specimen: Swab from Nasopharynx Updated: 08/29/23 2341     COVID19 Detected     Influenza A PCR Not Detected     Influenza B PCR Not Detected    Narrative:      Fact sheet for providers: https://www.fda.gov/media/085304/download    Fact sheet for patients: https://www.fda.gov/media/998869/download    Test performed by PCR.  Influenza A and Influenza B negative results should be considered presumptive in samples that have a positive SARS-CoV-2 result.    Competitive inhibition studies showed that SARS-CoV-2 virus, when present at concentrations above 3.6E+04 copies/mL, can inhibit the detection and amplification of influenza A and influenza B virus RNA if present at or below 1.8E+02 copies/mL or 4.9E+02 copies/mL, respectively, and may lead to false negative influenza virus results. If co-infection with influenza A or influenza B virus is suspected in samples with a positive SARS-CoV-2 result, the sample should be re-tested with another FDA cleared, approved, or authorized influenza test, if influenza virus detection would change clinical management.             CT Head Without Contrast    Result Date: 8/30/2023  FINAL REPORT TECHNIQUE: null CLINICAL HISTORY: Head trauma, minor (Age  >= 65y) COMPARISON: null FINDINGS: CT head without contrast Comparison: None Findings: No intracranial mass, midline shift, hydrocephalus, or acute hemorrhage. Chronic parenchymal volume loss. Nonspecific scattered white matter changes, most likely reflective of chronic small vessel ischemic changes in patient of this age. No mastoid effusion. No air fluid levels in paranasal sinuses. No acute fracture.     Impression: IMPRESSION: No acute intracranial findings. Chronic changes as described. Authenticated and Electronically Signed by Jennifer Griggs MD on 08/30/2023 12:37:18 AM    CT Cervical Spine Without Contrast    Result Date: 8/30/2023  FINAL REPORT TECHNIQUE: null CLINICAL HISTORY: Neck trauma (Age >= 65y) COMPARISON: null FINDINGS: Exam: CT cervical spine without IV contrast. Comparison: None Findings: No acute fracture or dislocation. Multilevel degenerative disc space narrowing with degenerative spurring. No high grade canal compromise. No suspicious osseous lesions. Paraspinal soft tissues unremarkable.     Impression: Impression: No acute fracture or dislocation. Degenerative changes. Authenticated and Electronically Signed by Jennifer Griggs MD on 08/30/2023 12:53:27 AM        [facility]     Amount and/or Complexity of Data Reviewed  Independent visualization of images, tracings, or specimens: yes        Initial impression of presenting illness: Fall, weakness    DDX: includes but is not limited to: COVID-19, intracranial hemorrhage, C-spine fracture    Patient arrives stable with vitals interpreted by myself.     Pertinent features from physical exam: Clear to auscultation, no increased work of breathing, nontender to abdominal palpation, no obvious trauma to the head.    Initial diagnostic plan: CBC, CMP, UA, EKG, chest x-ray, COVID swab, CT head C-spine    Results from initial plan were reviewed and interpreted by me revealing COVID-positive    Diagnostic information from other sources: Discussed with son  at bedside    Interventions / Re-evaluation: Observed in emergency department for over 2 and half hours with no change in vital signs, no hypoxia    Results/clinical rationale were discussed with patient and son at bedside    Consultations/Discussion of results with other physicians: Discussed negative work-up in the emergency department for trauma, COVID diagnosis.  Given this, suspect this is the etiology of patient's diffuse weakness.  Shared decision making with son for admission for evaluation by physical therapy and consideration of rehab versus discharge.  Patient's son would like to take him home at this time given they now have a firm diagnosis and will represent for any worsening of patient's functional status.  Given strict turn precaution for severe chest pain or shortness of breath.    Disposition plan: Discharge  -----        Final diagnoses:   COVID-19          Cristian Anaya MD  08/30/23 0716

## 2023-08-30 NOTE — DISCHARGE INSTRUCTIONS
You were evaluated for weakness.  We swabbed you for COVID and flu and found that you had COVID-19.  We feel as though this is the etiology of her symptoms.  We got a CT scan of your head neck that was negative for any concern for trauma.  You are now stable for discharge.  Would recommend isolating for the next 5 days.  Would follow-up with Dr. Pabon to ensure that you improve appropriately.  If you begin to have severe chest pain or shortness of breath, please make emergency department for further evaluation.  You are now stable for discharge.

## 2023-08-30 NOTE — NURSING NOTE
Report received from [facility]. All questions answered appropriately. Awaiting pt. Arrival to room 323.

## 2023-08-30 NOTE — H&P
[facility]   HISTORY AND PHYSICAL      Name:  Ramu Bustos   Age:  73 y.o.  Sex:  male  :  1950  MRN:  [id number]   Visit Number:  [id number]  Admission Date:  2023  Date Of Service:  23  Primary Care Physician:  Pérez Bernstein MD     Admitting diagnosis:      COVID-19    Weakness    Repeated falls    Abnormality of gait and mobility    Dementia without behavioral disturbance    Hyperlipidemia        History Of Presenting Illness:      73-year-old patient with past medical history of mild to moderate dementia who is ambulatory and able to answer simple questions, hyperlipidemia, who comes in to the ER repeatedly because of multiple falls.  He had a presentation in the ER yesterday where he was diagnosed to have COVID.  Though he was asymptomatic so he was discharged home.  Today morning he had fell 2 times and so wife brought him back to the ER and CT scan of the head was done.  It did not show any acute event so MRI has been ordered for possibility of findings of TIA as he has been weak and has been falling recently.  Prior to this he had been ambulatory has been eating well and has been compliant with his medications.  I had seen him also in the office because of history of fall and he had been on the Coreg which was held due to orthostasis at that time.  Patient has not been orthostatic but has been falling and unstable gait and is being admitted further for management and evaluation.  He did not have any symptoms but after admission he has COVID-positive and there was low-grade fever of 100.8.  Patient denies any coughing or any headaches.  At present he is pleasantly confused but is able to answer simple questions and denies any complaints.  He has not got out of the bed and gait has not been checked since admission    Review Of Systems:     The following systems were reviewed and negative;  constitution, eyes, ENT, respiratory, cardiovascular,  gastrointestinal, genitourinary, musculoskeletal, neurological and behavioral/psych,  Skin except as above.     Past Medical History:    Past Medical History:   Diagnosis Date    Coronary artery disease     REPORTS HAD 2 CORONARY STENTS PLACED    Disease of thyroid gland     Fracture     HAND (UNSURE SIDE)    Heart attack     History of heart artery stent     Hypertension     Snores     Wears glasses        Past Surgical history:    Past Surgical History:   Procedure Laterality Date    BACK SURGERY      CARDIAC SURGERY      CARDIAC CATH WITH STENT PLACEMENT X2    COLONOSCOPY      COLONOSCOPY N/A 6/15/2017    Procedure: COLONOSCOPY WITH BIOPSY  ;  Surgeon: Christiano Armando MD;  Location: [facility] ENDOSCOPY;  Service:     TONSILLECTOMY         Social History:    Social History     Socioeconomic History    Marital status:    Tobacco Use    Smoking status: Former     Packs/day: 1.00     Years: 13.00     Pack years: 13.00     Types: Cigarettes     Quit date:      Years since quittin.6    Smokeless tobacco: Never   Vaping Use    Vaping Use: Never used   Substance and Sexual Activity    Alcohol use: Never    Drug use: Never    Sexual activity: Defer       Family History:    Family History   Problem Relation Age of Onset    Colon cancer Cousin     No Known Problems Mother     Heart attack Father          Allergies:      Patient has no known allergies.    Home Medications:    Prior to Admission Medications       Prescriptions Last Dose Informant Patient Reported? Taking?    levothyroxine (SYNTHROID, LEVOTHROID) 100 MCG tablet Patient Taking Differently Spouse/Significant Other No Yes    Take 1 tablet by mouth Daily.    Patient taking differently:  Take 175 mcg by mouth Daily.    meloxicam (MOBIC) 15 MG tablet Unknown Spouse/Significant Other Yes No    Take 1 tablet by mouth Daily.    NAMZARIC 28-10 MG capsule sustained-release 24 hr Unknown  No No    Take 28 mg by mouth Daily.     simvastatin (ZOCOR) 40 MG tablet Unknown Self Yes No    Take 1 tablet by mouth Every Night.                   Vital Signs:    Temp:  [98.7 °F (37.1 °C)-100.8 °F (38.2 °C)] 99.5 °F (37.5 °C)  Heart Rate:  [] 86  Resp:  [16-18] 18  BP: (132-158)/(73-96) 140/78        08/30/23  1004 08/30/23  1355   Weight: 99.8 kg (220 lb) 97.3 kg (214 lb 8.1 oz)       Body mass index is 28.3 kg/m².    Physical Exam:      General Appearance:    Alert and cooperative,  And lying comfortably in bed   Head:    Atraumatic and normocephalic, without obvious abnormality.   Eyes:            PERRLA, conjunctivae and sclerae normal, no Icterus. No pallor. Extraocular movements are within normal limits.   Ears:    Ears appear intact with no abnormalities noted.   Throat:   No oral lesions, no thrush, oral mucosa moist.   Neck:   Supple, trachea midline, no thyromegaly, no carotid bruit, no lymphadenopathy   Lungs:    Breath sounds heard bilaterally equally.  No crackles or wheezing. No Pleural rub or bronchial breathing.       Heart:    Normal S1 and S2, no murmur, no gallop, no rub. No JVD   Abdomen:     Normal bowel sounds, no masses, no organomegaly. Soft   nontender, nondistended, no guarding, no rebound    tenderness   Extremities:   Moves all extremities well, no edema, no cyanosis, no          clubbing   Skin:   No  bruising or rash   Neurologic:   Cranial nerves 2 - 12 grossly intact, sensation intact, Motor power is normal and equal bilaterally.  He does have mild dementia.  His gait has not been checked as he is unsteady on walking       EKG:      Sinus rhythm    Labs:    Lab Results (last 24 hours)       Procedure Component Value Units Date/Time    Urinalysis With Culture If Indicated - Urine, Clean Catch [[id number]]  (Abnormal) Collected: 08/30/23 1332    Specimen: Urine, Clean Catch Updated: 08/30/23 1337     Color, UA Yellow     Appearance, UA Clear     pH, UA 5.5     Specific Gravity, UA 1.020     Glucose, UA Negative      Ketones, UA Trace     Bilirubin, UA Negative     Blood, UA Negative     Protein, UA Negative     Leuk Esterase, UA Negative     Nitrite, UA Negative     Urobilinogen, UA 0.2 E.U./dL    Narrative:      In absence of clinical symptoms, the presence of pyuria, bacteria, and/or nitrites on the urinalysis result does not correlate with infection.  Urine microscopic not indicated.    Single High Sensitivity Troponin T [[id number]]  (Abnormal) Collected: 08/30/23 1010    Specimen: Blood Updated: 08/30/23 1137     HS Troponin T 19 ng/L     Narrative:      High Sensitive Troponin T Reference Range:  <10.0 ng/L- Negative Female for AMI  <15.0 ng/L- Negative Male for AMI  >=10 - Abnormal Female indicating possible myocardial injury.  >=15 - Abnormal Male indicating possible myocardial injury.   Clinicians would have to utilize clinical acumen, EKG, Troponin, and serial changes to determine if it is an Acute Myocardial Infarction or myocardial injury due to an underlying chronic condition.         [place] Draw [[id number]] Collected: 08/30/23 1010    Specimen: Blood Updated: 08/30/23 1115    Narrative:      The following orders were created for panel order [place] Draw.  Procedure                               Abnormality         Status                     ---------                               -----------         ------                     Green Top (Gel)[[id number]]                                  Final result               Lavender Top[[id number]]                                     Final result               Gold Top - SST[[id number]]                                   Final result               Light Blue Top[[id number]]                                   Final result                 Please view results for these tests on the individual orders.    Green Top (Gel) [[id number]] Collected: 08/30/23 1010    Specimen: Blood Updated: 08/30/23 1115     Extra Tube Hold for add-ons.     Comment: Auto resulted.       Lavender Top  [[id number]] Collected: 08/30/23 1010    Specimen: Blood Updated: 08/30/23 1115     Extra Tube hold for add-on     Comment: Auto resulted       Gold Top - SST [[id number]] Collected: 08/30/23 1010    Specimen: Blood Updated: 08/30/23 1115     Extra Tube Hold for add-ons.     Comment: Auto resulted.       Light Blue Top [[id number]] Collected: 08/30/23 1010    Specimen: Blood Updated: 08/30/23 1115     Extra Tube Hold for add-ons.     Comment: Auto resulted       Lipase [[id number]]  (Normal) Collected: 08/30/23 1010    Specimen: Blood Updated: 08/30/23 1032     Lipase 38 U/L     Comprehensive Metabolic Panel [[id number]]  (Abnormal) Collected: 08/30/23 1010    Specimen: Blood Updated: 08/30/23 1032     Glucose 103 mg/dL      BUN 16 mg/dL      Creatinine 1.10 mg/dL      Sodium 138 mmol/L      Potassium 3.8 mmol/L      Chloride 101 mmol/L      CO2 24.8 mmol/L      Calcium 9.1 mg/dL      Total Protein 7.0 g/dL      Albumin 4.0 g/dL      ALT (SGPT) 10 U/L      AST (SGOT) 18 U/L      Alkaline Phosphatase 107 U/L      Total Bilirubin 0.7 mg/dL      Globulin 3.0 gm/dL      A/G Ratio 1.3 g/dL      BUN/Creatinine Ratio 14.5     Anion Gap 12.2 mmol/L      eGFR 70.0 mL/min/1.73     Narrative:      GFR Normal >60  Chronic Kidney Disease <60  Kidney Failure <15    The GFR formula is only valid for adults with stable renal function between ages 18 and 70.    CBC & Differential [[id number]]  (Abnormal) Collected: 08/30/23 1010    Specimen: Blood Updated: 08/30/23 1023    Narrative:      The following orders were created for panel order CBC & Differential.  Procedure                               Abnormality         Status                     ---------                               -----------         ------                     CBC Auto Differential[[id number]]        Abnormal            Final result                 Please view results for these tests on the individual orders.    CBC Auto Differential [[id number]]  (Abnormal) Collected:  08/30/23 1010    Specimen: Blood Updated: 08/30/23 1023     WBC 8.40 10*3/mm3      RBC 4.85 10*6/mm3      Hemoglobin 14.6 g/dL      Hematocrit 43.2 %      MCV 89.1 fL      MCH 30.1 pg      MCHC 33.8 g/dL      RDW 12.6 %      RDW-SD 41.1 fl      MPV 9.6 fL      Platelets 229 10*3/mm3      Neutrophil % 72.1 %      Lymphocyte % 12.5 %      Monocyte % 13.6 %      Eosinophil % 0.7 %      Basophil % 0.7 %      Immature Grans % 0.4 %      Neutrophils, Absolute 6.06 10*3/mm3      Lymphocytes, Absolute 1.05 10*3/mm3      Monocytes, Absolute 1.14 10*3/mm3      Eosinophils, Absolute 0.06 10*3/mm3      Basophils, Absolute 0.06 10*3/mm3      Immature Grans, Absolute 0.03 10*3/mm3      nRBC 0.0 /100 WBC             Radiology:    Imaging Results (Last 72 Hours)       ** No results found for the last 72 hours. **            Assessment:    Assessment & Plan       COVID-19    Weakness    Repeated falls    Abnormality of gait and mobility    Dementia without behavioral disturbance    Hyperlipidemia  Hypothyroidism      Plan:     1.  COVID-19 infection-he has low-grade fever and otherwise no respiratory symptoms.  Will treat symptomatically and supportive care    2.  History of abnormal gait with repeated falls-this seems to be a concern and he will need PT OT and further evaluation.  MRI of the brain has been ordered.  We will avoid any anticoagulant but will give aspirin for risk of his atherosclerosis and high risk to do anything else due to repeated falls.  He will benefit from skilled rehab and evaluation will be done accordingly    3.  Hypothyroidism-he has been on the Synthroid and will check his TSH as well as free T4    Goals of treatment plan of care has been addressed with the patient and the nursing staff is aware of the plan of care and further evaluation to be done based on symptomatology and work-up    Pérez Bernstein MD  08/30/23  18:07 EDT    Please note that portions of this note were completed with a voice  recognition program.

## 2023-08-30 NOTE — PLAN OF CARE
Goal Outcome Evaluation:      POC discussed with pt. And spouse Katerin. VSS on RA. Febrile this day- PO Tylenol given. Fall precautions in place. New admit from ED diagnosed with Covid-19. Discharge pending.  Problem: Fall Injury Risk  Goal: Absence of Fall and Fall-Related Injury  Outcome: Ongoing, Progressing  Intervention: Promote Injury-Free Environment  Recent Flowsheet Documentation  Taken 8/30/2023 1600 by Francisco Duffy RN  Safety Promotion/Fall Prevention:   activity supervised   assistive device/personal items within reach   clutter free environment maintained   fall prevention program maintained   nonskid shoes/slippers when out of bed   safety round/check completed     Problem: Skin Injury Risk Increased  Goal: Skin Health and Integrity  Outcome: Ongoing, Progressing  Intervention: Optimize Skin Protection  Recent Flowsheet Documentation  Taken 8/30/2023 1600 by Francisco Duffy RN  Head of Bed (HOB) Positioning: HOB at 30-45 degrees     Problem: Hypertension Comorbidity  Goal: Blood Pressure in Desired Range  Outcome: Ongoing, Progressing     Problem: Gas Exchange Impaired  Goal: Optimal Gas Exchange  Outcome: Ongoing, Progressing  Intervention: Optimize Oxygenation and Ventilation  Recent Flowsheet Documentation  Taken 8/30/2023 1600 by Francisco Duffy RN  Head of Bed (HOB) Positioning: HOB at 30-45 degrees

## 2023-08-30 NOTE — ED PROVIDER NOTES
HPI: Ramu Bustos is a 73 y.o. male who presents to the emergency department complaining of weakness, difficulty with gait and frequent falls.  Patient has dementia and cannot provide any history.  Per report patient was here last night with same complaint and diagnosed with COVID-19.  Admission was discussed and family wanted to take patient home.  Today they feel that they cannot take care of patient and would like him to be admitted.  Patient has no specific complaints.      REVIEW OF SYSTEMS: Cannot obtain secondary to dementia    PAST MEDICAL HISTORY:   Past Medical History:   Diagnosis Date    Coronary artery disease     REPORTS HAD 2 CORONARY STENTS PLACED    Disease of thyroid gland     Fracture     HAND (UNSURE SIDE)    Heart attack 2014    History of heart artery stent     Hypertension     Snores     Wears glasses         FAMILY HISTORY:   Family History   Problem Relation Age of Onset    Colon cancer Cousin     No Known Problems Mother     Heart attack Father         SOCIAL HISTORY:   Social History     Socioeconomic History    Marital status:    Tobacco Use    Smoking status: Former     Packs/day: 1.00     Years: 13.00     Pack years: 13.00     Types: Cigarettes     Quit date:      Years since quittin.6    Smokeless tobacco: Never   Substance and Sexual Activity    Alcohol use: No    Drug use: No    Sexual activity: Defer        SURGICAL HISTORY:   Past Surgical History:   Procedure Laterality Date    BACK SURGERY      CARDIAC SURGERY      CARDIAC CATH WITH STENT PLACEMENT X2    COLONOSCOPY  2003    COLONOSCOPY N/A 6/15/2017    Procedure: COLONOSCOPY WITH BIOPSY  ;  Surgeon: Christiano Armando MD;  Location: [facility] ENDOSCOPY;  Service:     TONSILLECTOMY          ALLERGIES: Patient has no known allergies.       PHYSICAL EXAM:   VITAL SIGNS:   Vitals:    23 1040   BP: 152/84   Pulse: 89   Resp:    Temp:    SpO2: 98%      CONSTITUTIONAL: Awake, well appearing, nontoxic    HENT: Atraumatic, normocephalic, oral mucosa moist, airway patent. Nares patent without drainage. External ears normal.   EYES: Conjunctivae clear, EOMI, PERRL   NECK: Trachea midline, nontender, supple   CARDIOVASCULAR: Normal heart rate, Normal rhythm.  PULMONARY/CHEST: Normal work of breathing. Clear to auscultation, no rhonchi, wheezes, or rales.  ABDOMINAL: Nondistended, soft, nontender, no rebound or guarding.  NEUROLOGIC: Nonfocal, moves all four extremities, no gross sensory or motor deficits.   EXTREMITIES: No clubbing, cyanosis, or edema   SKIN: Warm, Dry, No erythema, No rash       ED COURSE / MEDICAL DECISION MAKING:     Ramu Bustos is a 73 y.o. male who presents to the emergency department for evaluation of general weakness, difficulty with gait and frequent falls.  Well-developed, well-nourished elderly man in no distress with exam as above.  His vital signs are normal.  His oxygen saturation is normal on room air at 98%.  His exam is nonfocal.  Will obtain repeat labs.  Disposition is likely to the hospital.    Differential diagnosis includes COVID-19, general weakness, electrolyte derangement among other etiologies.    EKG interpreted by me: Sinus rhythm, normal rate, leftward axis, some nonspecific T waves throughout, this is an abnormal EKG    Labs are nonactionable.  I discussed the case with Dr. Bernstein, we are going to check MRI due to patient's gait difficulty.  Patient will be admitted for further testing and monitoring.  Patient will ultimately need PT/OT evaluation and possible nursing home or short-term rehab.    Final diagnoses:   Gait abnormality   General weakness   Requires assistance with activities of daily living (ADL)   COVID-19        Les Lazar MD  08/30/23 [id number]

## 2023-08-30 NOTE — ED NOTES
RN completed orthostatic vitals at this time. Patient was not able to stand upright despite having two RNs to assist. Patient became dizzy upon attempting to stand and became tachycardic. Dr. Lazar notified.

## 2023-08-30 NOTE — NURSING NOTE
Pt. Oral temp. 100.8- Dr. Bernstein notified. Orders obtained for po Tylenol. Tylenol- administered- quilt removed, with sheet in place- cool compress applied to forehead. No s/s of acute distress noted.    Oral temp 99.5 post tylenol administration and interventions implemented.

## 2023-08-30 NOTE — ED NOTES
At this time, House Supervisor called and said they are stat cleaning a room on the floor and will call back with the assignment in roughly 45 minutes.

## 2023-08-31 ENCOUNTER — APPOINTMENT (OUTPATIENT)
Dept: GENERAL RADIOLOGY | Facility: HOSPITAL | Age: 73
End: 2023-08-31
Payer: MEDICARE

## 2023-08-31 LAB
ALBUMIN SERPL-MCNC: 3.6 G/DL (ref 3.5–5.2)
ALBUMIN/GLOB SERPL: 1.3 G/DL
ALP SERPL-CCNC: 92 U/L (ref 39–117)
ALT SERPL W P-5'-P-CCNC: 12 U/L (ref 1–41)
ANION GAP SERPL CALCULATED.3IONS-SCNC: 12 MMOL/L (ref 5–15)
AST SERPL-CCNC: 26 U/L (ref 1–40)
BASOPHILS # BLD AUTO: 0.05 10*3/MM3 (ref 0–0.2)
BASOPHILS NFR BLD AUTO: 0.7 % (ref 0–1.5)
BILIRUB SERPL-MCNC: 0.5 MG/DL (ref 0–1.2)
BUN SERPL-MCNC: 19 MG/DL (ref 8–23)
BUN/CREAT SERPL: 18.8 (ref 7–25)
CALCIUM SPEC-SCNC: 8.3 MG/DL (ref 8.6–10.5)
CHLORIDE SERPL-SCNC: 103 MMOL/L (ref 98–107)
CO2 SERPL-SCNC: 23 MMOL/L (ref 22–29)
CREAT SERPL-MCNC: 1.01 MG/DL (ref 0.76–1.27)
D-LACTATE SERPL-SCNC: 1 MMOL/L (ref 0.5–2)
DEPRECATED RDW RBC AUTO: 42.4 FL (ref 37–54)
EGFRCR SERPLBLD CKD-EPI 2021: 77.6 ML/MIN/1.73
EOSINOPHIL # BLD AUTO: 0.02 10*3/MM3 (ref 0–0.4)
EOSINOPHIL NFR BLD AUTO: 0.3 % (ref 0.3–6.2)
ERYTHROCYTE [DISTWIDTH] IN BLOOD BY AUTOMATED COUNT: 12.7 % (ref 12.3–15.4)
GLOBULIN UR ELPH-MCNC: 2.7 GM/DL
GLUCOSE SERPL-MCNC: 98 MG/DL (ref 65–99)
HCT VFR BLD AUTO: 40 % (ref 37.5–51)
HGB BLD-MCNC: 13.3 G/DL (ref 13–17.7)
IMM GRANULOCYTES # BLD AUTO: 0.02 10*3/MM3 (ref 0–0.05)
IMM GRANULOCYTES NFR BLD AUTO: 0.3 % (ref 0–0.5)
LYMPHOCYTES # BLD AUTO: 1.35 10*3/MM3 (ref 0.7–3.1)
LYMPHOCYTES NFR BLD AUTO: 18.4 % (ref 19.6–45.3)
MCH RBC QN AUTO: 29.9 PG (ref 26.6–33)
MCHC RBC AUTO-ENTMCNC: 33.3 G/DL (ref 31.5–35.7)
MCV RBC AUTO: 89.9 FL (ref 79–97)
MONOCYTES # BLD AUTO: 1.07 10*3/MM3 (ref 0.1–0.9)
MONOCYTES NFR BLD AUTO: 14.6 % (ref 5–12)
NEUTROPHILS NFR BLD AUTO: 4.84 10*3/MM3 (ref 1.7–7)
NEUTROPHILS NFR BLD AUTO: 65.7 % (ref 42.7–76)
NRBC BLD AUTO-RTO: 0 /100 WBC (ref 0–0.2)
PLATELET # BLD AUTO: 210 10*3/MM3 (ref 140–450)
PMV BLD AUTO: 9.6 FL (ref 6–12)
POTASSIUM SERPL-SCNC: 3.8 MMOL/L (ref 3.5–5.2)
PROCALCITONIN SERPL-MCNC: 0.05 NG/ML (ref 0–0.25)
PROT SERPL-MCNC: 6.3 G/DL (ref 6–8.5)
RBC # BLD AUTO: 4.45 10*6/MM3 (ref 4.14–5.8)
SODIUM SERPL-SCNC: 138 MMOL/L (ref 136–145)
TSH SERPL DL<=0.05 MIU/L-ACNC: 0.09 UIU/ML (ref 0.27–4.2)
WBC NRBC COR # BLD: 7.35 10*3/MM3 (ref 3.4–10.8)

## 2023-08-31 PROCEDURE — 84145 PROCALCITONIN (PCT): CPT | Performed by: INTERNAL MEDICINE

## 2023-08-31 PROCEDURE — 80053 COMPREHEN METABOLIC PANEL: CPT | Performed by: INTERNAL MEDICINE

## 2023-08-31 PROCEDURE — 71045 X-RAY EXAM CHEST 1 VIEW: CPT

## 2023-08-31 PROCEDURE — 25010000002 ENOXAPARIN PER 10 MG: Performed by: INTERNAL MEDICINE

## 2023-08-31 PROCEDURE — 84443 ASSAY THYROID STIM HORMONE: CPT | Performed by: INTERNAL MEDICINE

## 2023-08-31 PROCEDURE — 83605 ASSAY OF LACTIC ACID: CPT | Performed by: INTERNAL MEDICINE

## 2023-08-31 PROCEDURE — G0378 HOSPITAL OBSERVATION PER HR: HCPCS

## 2023-08-31 PROCEDURE — 87040 BLOOD CULTURE FOR BACTERIA: CPT | Performed by: INTERNAL MEDICINE

## 2023-08-31 PROCEDURE — 96372 THER/PROPH/DIAG INJ SC/IM: CPT

## 2023-08-31 PROCEDURE — 97162 PT EVAL MOD COMPLEX 30 MIN: CPT

## 2023-08-31 PROCEDURE — 85025 COMPLETE CBC W/AUTO DIFF WBC: CPT | Performed by: INTERNAL MEDICINE

## 2023-08-31 RX ORDER — LEVOTHYROXINE SODIUM 175 UG/1
175 TABLET ORAL DAILY
COMMUNITY
End: 2023-09-01 | Stop reason: HOSPADM

## 2023-08-31 RX ORDER — LEVOTHYROXINE SODIUM 0.15 MG/1
150 TABLET ORAL
Status: DISCONTINUED | OUTPATIENT
Start: 2023-08-31 | End: 2023-09-01 | Stop reason: HOSPADM

## 2023-08-31 RX ORDER — ENOXAPARIN SODIUM 100 MG/ML
40 INJECTION SUBCUTANEOUS DAILY
Status: DISCONTINUED | OUTPATIENT
Start: 2023-08-31 | End: 2023-09-01 | Stop reason: HOSPADM

## 2023-08-31 RX ORDER — ASPIRIN 81 MG/1
81 TABLET ORAL DAILY
Status: DISCONTINUED | OUTPATIENT
Start: 2023-08-31 | End: 2023-09-01 | Stop reason: HOSPADM

## 2023-08-31 RX ADMIN — ENOXAPARIN SODIUM 40 MG: 100 INJECTION SUBCUTANEOUS at 10:33

## 2023-08-31 RX ADMIN — Medication 10 ML: at 21:43

## 2023-08-31 RX ADMIN — ACETAMINOPHEN 650 MG: 325 TABLET, FILM COATED ORAL at 10:33

## 2023-08-31 RX ADMIN — ATORVASTATIN CALCIUM 20 MG: 20 TABLET, FILM COATED ORAL at 10:33

## 2023-08-31 RX ADMIN — MEMANTINE 10 MG: 5 TABLET ORAL at 10:33

## 2023-08-31 RX ADMIN — Medication 10 ML: at 10:36

## 2023-08-31 RX ADMIN — ASPIRIN 81 MG: 81 TABLET, COATED ORAL at 10:33

## 2023-08-31 RX ADMIN — LEVOTHYROXINE SODIUM 150 MCG: 150 TABLET ORAL at 10:33

## 2023-08-31 RX ADMIN — DONEPEZIL HYDROCHLORIDE 10 MG: 5 TABLET, FILM COATED ORAL at 21:43

## 2023-08-31 NOTE — PLAN OF CARE
Problem: Fall Injury Risk  Goal: Absence of Fall and Fall-Related Injury  Outcome: Ongoing, Progressing  Intervention: Promote Injury-Free Environment  Recent Flowsheet Documentation  Taken 8/31/2023 1634 by Francisco Duffy RN  Safety Promotion/Fall Prevention:   activity supervised   assistive device/personal items within reach   clutter free environment maintained   fall prevention program maintained   nonskid shoes/slippers when out of bed   safety round/check completed  Taken 8/31/2023 1447 by Francisco Duffy RN  Safety Promotion/Fall Prevention:   activity supervised   clutter free environment maintained   assistive device/personal items within reach   fall prevention program maintained   nonskid shoes/slippers when out of bed   safety round/check completed  Taken 8/31/2023 1143 by Francisco Duffy RN  Safety Promotion/Fall Prevention:   activity supervised   assistive device/personal items within reach   clutter free environment maintained   fall prevention program maintained   nonskid shoes/slippers when out of bed   safety round/check completed     Problem: Skin Injury Risk Increased  Goal: Skin Health and Integrity  Outcome: Ongoing, Progressing  Intervention: Optimize Skin Protection  Recent Flowsheet Documentation  Taken 8/31/2023 1634 by Francisco Duffy RN  Head of Bed (HOB) Positioning: HOB at 45 degrees  Taken 8/31/2023 1447 by Francisco Duffy RN  Head of Bed (HOB) Positioning: HOB at 30-45 degrees  Taken 8/31/2023 1221 by Francisco Duffy RN  Head of Bed (HOB) Positioning: HOB at 45 degrees  Taken 8/31/2023 1143 by Francisco Duffy RN  Head of Bed (HOB) Positioning: HOB at 30-45 degrees     Problem: Hypertension Comorbidity  Goal: Blood Pressure in Desired Range  Outcome: Ongoing, Progressing     Problem: Gas Exchange Impaired  Goal: Optimal Gas Exchange  Outcome: Ongoing, Progressing  Intervention: Optimize Oxygenation and Ventilation  Recent Flowsheet Documentation  Taken 8/31/2023 1634 by Francisco Duffy RN  Head  of Bed (HOB) Positioning: HOB at 45 degrees  Taken 8/31/2023 1447 by Francisco Duffy RN  Head of Bed (HOB) Positioning: HOB at 30-45 degrees  Taken 8/31/2023 1221 by Francisco Duffy RN  Head of Bed (HOB) Positioning: HOB at 45 degrees  Taken 8/31/2023 1143 by Francisco Duffy RN  Head of Bed (HOB) Positioning: HOB at 30-45 degrees     Problem: Adult Inpatient Plan of Care  Goal: Plan of Care Review  Outcome: Ongoing, Progressing  Flowsheets (Taken 8/31/2023 1703)  Progress: improving  Plan of Care Reviewed With:   patient   spouse  Outcome Evaluation: VSS on RA. Febrile this shift and managed with PO tylenol and cool compress- Dr. Bernstein made aware with new orders received. Pt. agreeable to work with PT and required one person assistance with walker with ambulation. Pt. denies pain of any nature or SOA this shift. Pleasantly confused and easily reoriented. Tolerating po diet and urinating without difficulty. Discharge pending.  Goal: Patient-Specific Goal (Individualized)  Outcome: Ongoing, Progressing  Goal: Absence of Hospital-Acquired Illness or Injury  Outcome: Ongoing, Progressing  Intervention: Identify and Manage Fall Risk  Recent Flowsheet Documentation  Taken 8/31/2023 1634 by Francisco Duffy RN  Safety Promotion/Fall Prevention:   activity supervised   assistive device/personal items within reach   clutter free environment maintained   fall prevention program maintained   nonskid shoes/slippers when out of bed   safety round/check completed  Taken 8/31/2023 1447 by Francisco Duffy RN  Safety Promotion/Fall Prevention:   activity supervised   clutter free environment maintained   assistive device/personal items within reach   fall prevention program maintained   nonskid shoes/slippers when out of bed   safety round/check completed  Taken 8/31/2023 1143 by Francisco Duffy RN  Safety Promotion/Fall Prevention:   activity supervised   assistive device/personal items within reach   clutter free environment maintained   fall  prevention program maintained   nonskid shoes/slippers when out of bed   safety round/check completed  Intervention: Prevent Skin Injury  Recent Flowsheet Documentation  Taken 8/31/2023 1634 by Francisco Duffy RN  Body Position: sitting up in bed  Taken 8/31/2023 1447 by Francisco Duffy RN  Body Position: supine, legs elevated  Taken 8/31/2023 1221 by Francisco Duffy RN  Body Position: sitting up in bed  Taken 8/31/2023 1143 by Francisco Dfufy RN  Body Position: supine, legs elevated  Intervention: Prevent and Manage VTE (Venous Thromboembolism) Risk  Recent Flowsheet Documentation  Taken 8/31/2023 1634 by Francisco Duffy RN  Activity Management: activity encouraged  Taken 8/31/2023 1447 by Francisco Duffy RN  Activity Management: activity encouraged  Taken 8/31/2023 1221 by Francisco Duffy RN  Activity Management: activity encouraged  Taken 8/31/2023 1143 by Francisco Duffy RN  Activity Management: back to bed  Taken 8/31/2023 1137 by Francisco Duffy RN  Activity Management: up to bedside commode  Intervention: Prevent Infection  Recent Flowsheet Documentation  Taken 8/31/2023 1634 by Francisco Duffy RN  Infection Prevention:   environmental surveillance performed   hand hygiene promoted   single patient room provided  Taken 8/31/2023 1447 by Francisco Duffy RN  Infection Prevention:   environmental surveillance performed   hand hygiene promoted   single patient room provided  Taken 8/31/2023 1143 by Francisco Duffy RN  Infection Prevention:   environmental surveillance performed   hand hygiene promoted   single patient room provided  Goal: Optimal Comfort and Wellbeing  Outcome: Ongoing, Progressing  Goal: Readiness for Transition of Care  Outcome: Ongoing, Progressing   Goal Outcome Evaluation:  Plan of Care Reviewed With: patient, spouse        Progress: improving  Outcome Evaluation: VSS on RA. Febrile this shift and managed with PO tylenol and cool compress- Dr. Bernstein made aware with new orders received. Pt. agreeable to work with PT  and required one person assistance with walker with ambulation. Pt. denies pain of any nature or SOA this shift. Pleasantly confused and easily reoriented. Tolerating po diet and urinating without difficulty. Discharge pending.

## 2023-08-31 NOTE — PROGRESS NOTES
"Adult Nutrition  Assessment/PES    Patient Name:  Ramu Bustos  YOB: 1950  MRN: [id number]  Admit Date:  8/30/2023    Assessment Date:  8/31/2023    Comments:    Pt with PMHX significant for dementia, HLD presented to [facility] with c/o multiple falls. Found to be COVID positive.  lbs, normal weight for age per BMI 28.68. MST 2 - pt unsure of recent weight loss. RD to complete MSA. Currently receiving a regular diet. One PO intake of 100% per flowsheets. No wounds noted. Will monitor and F/U. Available PRN.      Reason for Assessment       Row Name 08/31/23 0932          Reason for Assessment    Reason For Assessment identified at risk by screening criteria     Identified At Risk by Screening Criteria MST SCORE 2+                      Labs/Tests/Procedures/Meds       Row Name 08/31/23 0932          Labs/Procedures/Meds    Lab Results Reviewed reviewed     Lab Results Comments WNL        Medications    Pertinent Medications Reviewed reviewed                    Physical Findings       Row Name 08/31/23 0933          Physical Findings    Overall Physical Appearance normal weight for age                    Estimated/Assessed Needs - Anthropometrics       Row Name 08/31/23 0933 08/31/23 0348       Anthropometrics    Weight -- 98.6 kg (217 lb 6 oz)    Height for Calculation 1.854 m (6' 1\") --    Weight for Calculation 99 kg (218 lb 4.1 oz)  CBW --       Estimated/Assessed Needs    Additional Documentation Fluid Requirements (Group);KCAL/KG (Group);Estimated Calorie Needs (Group);Protein Requirements (Group) --       Estimated Calorie Needs    Estimated Calorie Requirement (kcal/day) 2,475-2,970 --    Estimated Calorie Need Method kcal/kg --       KCAL/KG    KCAL/KG 30 Kcal/Kg (kcal);25 Kcal/Kg (kcal) --    25 Kcal/Kg (kcal) 2475 --    30 Kcal/Kg (kcal) 2970 --       Protein Requirements    Weight Used For Protein Calculations 99 kg (218 lb 4.1 oz)  CBW --    Est Protein Requirement Amount (gms/kg) " 1.0 gm protein --    Estimated Protein Requirements (gms/day) 99 --       Fluid Requirements    Fluid Requirements (mL/day) 2475  1 mL/kcal --    RDA Method (mL) 2475 --                   Nutrition Prescription Ordered       Row Name 08/31/23 0934          Nutrition Prescription PO    Current PO Diet Regular     Fluid Consistency Thin                    Evaluation of Received Nutrient/Fluid Intake       Row Name 08/31/23 0934          PO Evaluation    Number of Days PO Intake Evaluated Insufficient Data                       Problem/Interventions:   Problem 1       Row Name 08/31/23 0935          Nutrition Diagnoses Problem 1    Problem 1 Nutrition Appropriate for Condition at this Time                          Intervention Goal       Row Name 08/31/23 0935          Intervention Goal    General Maintain nutrition;Meet nutritional needs for age/condition     PO Establish PO     Weight Maintain weight                    Nutrition Intervention       Row Name 08/31/23 0935          Nutrition Intervention    RD/Tech Action Follow Tx progress;Encourage intake;Care plan reviewd                    Nutrition Prescription       Row Name 08/31/23 0935          Other Orders    Obtain Weight Daily     Obtain Weight Ordered? No, recommended     Supplement Vitamin mineral supplement     Supplement Ordered? No, recommended     Labs K+;Phos;Mg++;Na+     Labs Ordered? No, recommended                    Education/Evaluation       Row Name 08/31/23 0935          Education    Education No discharge needs identified at this time        Monitor/Evaluation    Monitor Per protocol;Weight;PO intake;Skin status;Symptoms;Pertinent labs                     Electronically signed by:  Nathalia Ceja RD  08/31/23 09:36 EDT

## 2023-08-31 NOTE — THERAPY EVALUATION
Patient Name: Ramu Bustos  : 1950    MRN: [id number]                              Today's Date: 2023       Admit Date: 2023    Visit Dx:     ICD-10-CM ICD-9-CM   1. Gait abnormality  R26.9 781.2   2. General weakness  R53.1 780.79   3. Requires assistance with activities of daily living (ADL)  Z74.1 V49.89   4. COVID-19  U07.1 079.89     Patient Active Problem List   Diagnosis    Colon cancer screening    COVID-19    Weakness    Repeated falls    Abnormality of gait and mobility    Dementia without behavioral disturbance    Hyperlipidemia     Past Medical History:   Diagnosis Date    Coronary artery disease     REPORTS HAD 2 CORONARY STENTS PLACED    Disease of thyroid gland     Fracture     HAND (UNSURE SIDE)    Heart attack     History of heart artery stent     Hypertension     Impaired mobility     Snores     Wears glasses      Past Surgical History:   Procedure Laterality Date    BACK SURGERY      CARDIAC SURGERY      CARDIAC CATH WITH STENT PLACEMENT X2    COLONOSCOPY      COLONOSCOPY N/A 6/15/2017    Procedure: COLONOSCOPY WITH BIOPSY  ;  Surgeon: Christiano Armando MD;  Location: [facility] ENDOSCOPY;  Service:     TONSILLECTOMY  1952      General Information       Row Name 23 1150          Physical Therapy Time and Intention    Document Type evaluation (P)   -NL     Mode of Treatment physical therapy (P)   -NL       Row Name 23 1150          General Information    Patient Profile Reviewed yes (P)   -NL     Prior Level of Function independent:;all household mobility;ADL's (P)   -NL     Existing Precautions/Restrictions fall (P)   -NL     Barriers to Rehab medically complex;previous functional deficit;cognitive status (P)   -NL       Row Name 23 1150          Living Environment    People in Home spouse (P)   -NL       Row Name 23 1150          Home Main Entrance    Number of Stairs, Main Entrance other (see comments) (P)   Unable to obtain information  d/t patient memory.  -NL       Row Name 08/31/23 1150          Stairs Within Home, Primary    Stairs, Within Home, Primary Unable to obtain info d/t patient memory. (P)   -NL       Row Name 08/31/23 1150          Cognition    Orientation Status (Cognition) oriented x 4 (P)   -NL       Row Name 08/31/23 1150          Safety Issues, Functional Mobility    Safety Issues Affecting Function (Mobility) ability to follow commands;awareness of need for assistance;safety precaution awareness;safety precautions follow-through/compliance;insight into deficits/self-awareness (P)   -NL     Impairments Affecting Function (Mobility) strength;endurance/activity tolerance;balance;cognition (P)   -NL     Cognitive Impairments, Mobility Safety/Performance awareness, need for assistance;insight into deficits/self-awareness;safety precaution awareness;safety precaution follow-through (P)   -NL               User Key  (r) = Recorded By, (t) = Taken By, (c) = Cosigned By      Initials Name Provider Type    Ross Murdock, PT Student PT Student                   Mobility       Row Name 08/31/23 1150          Bed Mobility    Bed Mobility supine-sit;sit-supine (P)   -NL     Supine-Sit [place] (Bed Mobility) modified independence (P)   -NL     Sit-Supine [place] (Bed Mobility) modified independence (P)   -NL     Assistive Device (Bed Mobility) bed rails;head of bed elevated (P)   -NL       Row Name 08/31/23 1150          Bed-Chair Transfer    Bed-Chair [place] (Transfers) not tested (P)   -NL       Row Name 08/31/23 1150          Sit-Stand Transfer    Sit-Stand [place] (Transfers) contact guard (P)   -NL     Assistive Device (Sit-Stand Transfers) walker, front-wheeled (P)   -NL       Row Name 08/31/23 1150          Gait/Stairs (Locomotion)    [place] Level (Gait) not tested (P)   -NL     [place] Level (Stairs) not tested (P)   -NL               User Key  (r) = Recorded By, (t) = Taken By, (c) = Cosigned By       Initials Name Provider Type    NL Ross Kaur, PT Student PT Student                   Obj/Interventions       Row Name 08/31/23 1150          Range of Motion Comprehensive    General Range of Motion lower extremity range of motion deficits identified (P)   -NL     Comment, General Range of Motion R knee extension lacking 10 degrees; L knee extension 5 degrees (P)   -NL       Row Name 08/31/23 1150          Strength Comprehensive (MMT)    General Manual Muscle Testing (MMT) Assessment lower extremity strength deficits identified (P)   -NL     Comment, General Manual Muscle Testing (MMT) Assessment BLE gross functional strength 4/5 (P)   -NL       Row Name 08/31/23 1150          Motor Skills    Therapeutic Exercise hip (P)   -NL       Row Name 08/31/23 1150          Hip (Therapeutic Exercise)    Hip (Therapeutic Exercise) strengthening exercise (P)   -NL     Hip Strengthening (Therapeutic Exercise) marching while standing;10 repetitions (P)   -NL       Row Name 08/31/23 1150          Balance    Balance Assessment sitting static balance;sitting dynamic balance;sit to stand dynamic balance;standing static balance;standing dynamic balance (P)   -NL     Static Sitting Balance minimal assist (P)   Leans back  -NL     Dynamic Sitting Balance minimal assist (P)   Leans back  -NL     Position, Sitting Balance unsupported;sitting edge of bed (P)   -NL     Sit to Stand Dynamic Balance contact guard (P)   -NL     Static Standing Balance minimal assist (P)   Leans right  -NL     Dynamic Standing Balance minimal assist (P)   Leans right  -NL     Position/Device Used, Standing Balance walker, front-wheeled (P)   -NL     Balance Interventions standing;static;tandem standing;weight shifting activity (P)   -NL       Row Name 08/31/23 1150          Sensory Assessment (Somatosensory)    Sensory Assessment (Somatosensory) not tested (P)   -NL               User Key  (r) = Recorded By, (t) = Taken By, (c) = Cosigned By      Initials  Name Provider Type    Ross Murdock, PT Student PT Student                   Goals/Plan       Row Name 08/31/23 1150          Gait Training Goal 1 (PT)    Activity/Assistive Device (Gait Training Goal 1, PT) gait (walking locomotion);assistive device use;decrease fall risk;improve balance and speed;increase endurance/gait distance;walker, rolling (P)   -NL     [place] Level (Gait Training Goal 1, PT) contact guard required (P)   -NL     Distance (Gait Training Goal 1, PT) 50' (P)   -NL     Time Frame (Gait Training Goal 1, PT) long term goal (LTG);10 days (P)   -NL     Progress/Outcome (Gait Training Goal 1, PT) goal ongoing (P)   -NL       Row Name 08/31/23 1150          ROM Goal 1 (PT)    ROM Goal 1 (PT) Right knee extension to 5 degrees. (P)   -NL     Time Frame (ROM Goal 1, PT) long-term goal (LTG);10 days (P)   -NL     Progress/Outcome (ROM Goal 1, PT) goal ongoing (P)   -NL       Row Name 08/31/23 1150          Patient Education Goal (PT)    Activity (Patient Education Goal, PT) Balance activities [id number] per HEP (P)   -NL     [place]/Cues/Accuracy (Memory Goal 2, PT) demonstrates adequately;verbalizes understanding (P)   -NL     Time Frame (Patient Education Goal, PT) long term goal (LTG);10 days (P)   -NL     Progress/Outcome (Patient Education Goal, PT) goal ongoing (P)   -NL       Row Name 08/31/23 1150          Therapy Assessment/Plan (PT)    Planned Therapy Interventions (PT) strengthening;home exercise program;patient/family education;gait training;balance training;stretching (P)   -NL               User Key  (r) = Recorded By, (t) = Taken By, (c) = Cosigned By      Initials Name Provider Type    Ross Murdock, PT Student PT Student                   Clinical Impression       Row Name 08/31/23 1150          Pain    Pretreatment Pain Rating 0/10 - no pain (P)   -NL     Posttreatment Pain Rating 0/10 - no pain (P)   -NL       Row Name 08/31/23 1150          Plan of Care Review    Plan  of Care Reviewed With patient (P)   -NL     Progress no change (P)   -NL     Outcome Evaluation PT evaluation completed today. Upon entry, patient was supine in bed, A/Ox4, no c/o pain, and was a poor historian. Patient was modified independence with supine-sit and sit-supine where he sat EOB and continued to lean back. With sit-stand CGA was required and min assist x1 was needed with balancing activities. Once standing, patient performed feet together balancing for 30 seconds w/o AD. He then performed tandem stance balance with R foot forward and then L foot forward for 15 seconds each using no AD expect to get into position. He leaned right with all balancing activities. R knee was noticed having a difficult time staying extended. He then performed standing marches x10 with RW. He was returned to bed with his lunch tray. Prior to d/c, patient is expected to benefit from skilled PT in order to improve balance and safe movement with activity. (P)   -NL       Row Name 08/31/23 1150          Therapy Assessment/Plan (PT)    Patient/Family Therapy Goals Statement (PT) Wants to go home. (P)   -NL     Rehab Potential (PT) good, to achieve stated therapy goals (P)   -NL     Criteria for Skilled Interventions Met (PT) yes;meets criteria;skilled treatment is necessary (P)   -NL     Therapy Frequency (PT) 5 times/wk (P)   -NL       Row Name 08/31/23 1150          Vital Signs    O2 Delivery Pre Treatment room air (P)   -NL     O2 Delivery Intra Treatment room air (P)   -NL     O2 Delivery Post Treatment room air (P)   -NL       Row Name 08/31/23 1150          Positioning and Restraints    Pre-Treatment Position in bed (P)   -NL     Post Treatment Position bed (P)   -NL     In Bed supine;call light within reach;encouraged to call for assist;exit alarm on (P)   -NL               User Key  (r) = Recorded By, (t) = Taken By, (c) = Cosigned By      Initials Name Provider Type    Ross Murdock, PT Student PT Student                    Outcome Measures       Row Name 08/31/23 1150 08/31/23 1040       How much help from another person do you currently need...    Turning from your back to your side while in flat bed without using bedrails? 4 (P)   -NL 3  -KRISH (r) KK (t) KRISH (c)    Moving from lying on back to sitting on the side of a flat bed without bedrails? 4 (P)   -NL 3  -KRISH (r) KK (t) KRISH (c)    Moving to and from a bed to a chair (including a wheelchair)? 3 (P)   -NL 2  -KRISH (r) KK (t) KRISH (c)    Standing up from a chair using your arms (e.g., wheelchair, bedside chair)? 3 (P)   -NL 2  -KRISH (r) KK (t) KRISH (c)    Climbing 3-5 steps with a railing? 2 (P)   -NL 2  -KRISH (r) KK (t) KRISH (c)    To walk in hospital room? 2 (P)   -NL 2  -KRISH (r) KK (t) KRISH (c)    AM-PAC 6 Clicks Score (PT) 18 (P)   -NL 14  -KRISH (r) KK (t)    Highest level of mobility 6 --> Walked 10 steps or more (P)   -NL 4 --> Transferred to chair/commode  -KRISH (r) KK (t)      Row Name 08/31/23 1150          Functional Assessment    Outcome Measure Options AM-PAC 6 Clicks Basic Mobility (PT) (P)   -NL               User Key  (r) = Recorded By, (t) = Taken By, (c) = Cosigned By      Initials Name Provider Type    Ananya Celaya, Nursing Student Nursing Student    Francisco Kumari, RN Registered Nurse    Ross Murdock, PT Student PT Student                                 Physical Therapy Education       Title: PT OT SLP Therapies (In Progress)       Topic: Physical Therapy (In Progress)       Point: Mobility training (In Progress)       Learning Progress Summary             Patient Acceptance, E, NR by NL at 8/31/2023 1150    Comment: Educated on importance of movement, role of PT in stay at hospital, proper body mechanics when standing, and how to perform balancing activites safely.                         Point: Home exercise program (In Progress)       Learning Progress Summary             Patient Acceptance, E, NR by NL at 8/31/2023 1150    Comment: Educated on importance of movement,  role of PT in stay at hospital, proper body mechanics when standing, and how to perform balancing activites safely.                         Point: Body mechanics (In Progress)       Learning Progress Summary             Patient Acceptance, E, NR by NL at 8/31/2023 1150    Comment: Educated on importance of movement, role of PT in stay at hospital, proper body mechanics when standing, and how to perform balancing activites safely.                                         User Key       Initials Effective Dates Name Provider Type Discipline    NL 07/13/23 -  Ross Kaur, PT Student PT Student PT                  PT Recommendation and Plan  Planned Therapy Interventions (PT): (P) strengthening, home exercise program, patient/family education, gait training, balance training, stretching  Plan of Care Reviewed With: (P) patient  Progress: (P) no change  Outcome Evaluation: (P) PT evaluation completed today. Upon entry, patient was supine in bed, A/Ox4, no c/o pain, and was a poor historian. Patient was modified independence with supine-sit and sit-supine where he sat EOB and continued to lean back. With sit-stand CGA was required and min assist x1 was needed with balancing activities. Once standing, patient performed feet together balancing for 30 seconds w/o AD. He then performed tandem stance balance with R foot forward and then L foot forward for 15 seconds each using no AD expect to get into position. He leaned right with all balancing activities. R knee was noticed having a difficult time staying extended. He then performed standing marches x10 with RW. He was returned to bed with his lunch tray. Prior to d/c, patient is expected to benefit from skilled PT in order to improve balance and safe movement with activity.     Time Calculation:   PT Evaluation Complexity  History, PT Evaluation Complexity: (P) 3 or more personal factors and/or comorbidities  Examination of Body Systems (PT Eval Complexity): (P) total of 3  or more elements  Clinical Presentation (PT Evaluation Complexity): (P) evolving  Clinical Decision Making (PT Evaluation Complexity): (P) moderate complexity  Overall Complexity (PT Evaluation Complexity): (P) moderate complexity     PT Charges       Row Name 08/31/23 1150             Time Calculation    Start Time 1150 (P)   -NL      PT Received On 08/31/23 (P)   -NL      PT Goal Re-Cert Due Date 09/10/23 (P)   -NL         Untimed Charges    PT Eval/Re-eval Minutes 40 (P)   -NL         Total Minutes    Untimed Charges Total Minutes 40 (P)   -NL       Total Minutes 40 (P)   -NL                User Key  (r) = Recorded By, (t) = Taken By, (c) = Cosigned By      Initials Name Provider Type    NL Ross Kaur, PT Student PT Student                  Therapy Charges for Today       Code Description Service Date Service Provider Modifiers Qty    15691788942 HC PT EVAL MOD COMPLEXITY 3 8/31/2023 Ross Kaur, PT Student GP 1            PT G-Codes  Outcome Measure Options: (P) AM-PAC 6 Clicks Basic Mobility (PT)  AM-PAC 6 Clicks Score (PT): (P) 18  PT Discharge Summary  Anticipated Discharge Disposition (PT): (P) home with home health    Ross Kaur, PT Student  8/31/2023

## 2023-08-31 NOTE — PLAN OF CARE
Goal Outcome Evaluation:  Plan of Care Reviewed With: (P) patient        Progress: (P) no change  Outcome Evaluation: (P) PT evaluation completed today. Upon entry, patient was supine in bed, A/Ox4, no c/o pain, and was a poor historian. Patient was modified independence with supine-sit and sit-supine where he sat EOB and continued to lean back. With sit-stand CGA was required and min assist x1 was needed with balancing activities. Once standing, patient performed feet together balancing for 30 seconds w/o AD. He then performed tandem stance balance with R foot forward and then L foot forward for 15 seconds each using no AD expect to get into position. He leaned right with all balancing activities. R knee was noticed having a difficult time staying extended. He then performed standing marches x10 with RW. He was returned to bed with his lunch tray. Prior to d/c, patient is expected to benefit from skilled PT in order to improve balance and safe movement with activity.      Anticipated Discharge Disposition (PT): (P) home with home health

## 2023-08-31 NOTE — PROGRESS NOTES
[facility]  INTERNAL MEDICINE PROGRESS NOTE    Name:  Ramu Bustos   Age:  73 y.o.  Sex:  male  :  1950  MRN:  [id number]   Visit Number:  [id number]  Admission Date:  2023  Date Of Service:  23  Primary Care Physician:  Pérez Bernstein MD     LOS: 1 day :  Patient Care Team:  Pérez Bernstein MD as PCP - General (Internal Medicine):      Subjective / Interval History:     73-year-old patient with past medical history of mild to moderate dementia who is ambulatory and able to answer simple questions, hyperlipidemia, who comes in to the ER repeatedly because of multiple falls.  He had a presentation in the ER yesterday where he was diagnosed to have COVID.  Though he was asymptomatic so he was discharged home.  Today morning he had fell 2 times and so wife brought him back to the ER and CT scan of the head was done.  It did not show any acute event so MRI has been ordered for possibility of findings of TIA as he has been weak and has been falling recently.  Prior to this he had been ambulatory has been eating well and has been compliant with his medications.  I had seen him also in the office because of history of fall and he had been on the Coreg which was held due to orthostasis at that time.  Patient has not been orthostatic but has been falling and unstable gait and is being admitted further for management and evaluation.  He did not have any symptoms but after admission he has COVID-positive and there was low-grade fever of 100.8.    Patient has been seen on .  He has had low-grade fever with temperature of 100.3.  Denies shortness of breath.  He is extremely weak and has not got out of bed and physical therapy evaluation is pending today.  The family states that he cannot take care of them as they do have COVID extremely sick and he is high risk of falls and has been falling several times at home      Vital Signs:    Temp:  [99 °F (37.2 °C)-100.8 °F (38.2 °C)]  100.5 °F (38.1 °C)  Heart Rate:  [] 97  Resp:  [16-18] 18  BP: (130-158)/(73-90) 145/88    Intake and output:    I/O last 3 completed shifts:  In: 240 [P.O.:240]  Out: -   No intake/output data recorded.    Physical Examination:    General Appearance:    Alert and cooperative, not in any acute distress.   Head:    Atraumatic and normocephalic, without obvious abnormality.   Eyes:            PERRLA,  No pallor. Extraocular movements are within normal limits.   Neck:   Supple,  No lymph glands, no bruit   Lungs:     Chest shape is normal. Breath sounds heard bilaterally equally.  No crackles or wheezing.     Heart:    Normal S1 and S2, no murmur,  No JVD   Abdomen:     Normal bowel sounds, no masses, no organomegaly. Soft     nontender, no guarding, no rebound tenderness   Extremities:   Moves all extremities well, no edema, no cyanosis,    Skin:   No  bruising or rash.   Neurologic:   Grossly nonfocal and moves all extremities.  Unstable gait     Laboratory results:  Results from last 7 days   Lab Units 08/31/23  0600 08/30/23  1010 08/29/23  2303   SODIUM mmol/L 138 138 138   POTASSIUM mmol/L 3.8 3.8 3.9   CHLORIDE mmol/L 103 101 103   CO2 mmol/L 23.0 24.8 24.2   BUN mg/dL 19 16 16   CREATININE mg/dL 1.01 1.10 0.99   CALCIUM mg/dL 8.3* 9.1 9.1   BILIRUBIN mg/dL 0.5 0.7 0.5   ALK PHOS U/L 92 107 110   ALT (SGPT) U/L 12 10 10   AST (SGOT) U/L 26 18 20   GLUCOSE mg/dL 98 103* 118*     Results from last 7 days   Lab Units 08/31/23  0600 08/30/23  1010 08/29/23  2303   WBC 10*3/mm3 7.35 8.40 9.96   HEMOGLOBIN g/dL 13.3 14.6 14.9   HEMATOCRIT % 40.0 43.2 43.8   PLATELETS 10*3/mm3 210 229 243         Results from last 7 days   Lab Units 08/30/23  1010 08/29/23  2303   HSTROP T ng/L 19* 19*           Radiology results:    Imaging Results (Last 24 Hours)       Procedure Component Value Units Date/Time    MRI Brain Without Contrast [[id number]] Collected: 08/30/23 2148     Updated: 08/30/23 2149    Narrative:      FINAL  REPORT    TECHNIQUE:  Multiplanar MR without contrast    CLINICAL HISTORY:  gait trouble, COVID POSITIVE, MULTIPLE FALLS WITH ABNORMAL GAIT.    FINDINGS:  Diffusion sequences show no signal abnormality to indicate acute  infarct.  Minimal scattered periventricular white matter signal  changes are seen compatible with moderate chronic ischemic  gliotic disease.  Moderate generalized atrophy is present.  No mass, hemorrhage or edema is seen.  Ventricles are normal.  Major vascular flow voids are intact.      Impression:      1.  No mass, acute infarct or hydrocephalus  2.  Atrophy and  chronic ischemic white matter changes    Authenticated and Electronically Signed by ZIA Mcallister MD on  08/30/2023 09:48:48 PM            I have reviewed the patient's radiology reports.    Medication Review:     I have reviewed the patients active and prn medications.     Assessment:      COVID-19    Weakness    Repeated falls    Abnormality of gait and mobility    Dementia without behavioral disturbance    Hyperlipidemia  Vascular dementia        Plan:    1.  COVID-19 infection-he has low-grade fever and otherwise no respiratory symptoms.  Will treat symptomatically and supportive care     2.  History of abnormal gait with repeated falls-this seems to be a concern and he will need PT OT and further evaluation.  MRI of the brain has been done and shows chronic ischemic findings we will avoid any anticoagulant but will give aspirin for risk of his atherosclerosis and high risk to do anything else due to repeated falls.  He will benefit from skilled rehab and evaluation will be done accordingly     3.  Hypothyroidism-based on his free T4 we will give him 150 mcg instead of the 175 mcg     Goals of treatment plan of care has been addressed with the patient and the nursing staff is aware of the plan of care and further evaluation to be done based on symptomatology and work-up    Pérez Bernstein MD  08/31/23  09:23 EDT      Please note  that portions of this note were completed with a voice recognition program.

## 2023-08-31 NOTE — CASE MANAGEMENT/SOCIAL WORK
Discharge Planning Assessment  [facility]     Patient Name: Ramu Bustos  MRN: [id number]  Today's Date: 8/31/2023    Admit Date: 8/30/2023    Plan: DCP   Discharge Needs Assessment       Row Name 08/31/23 0941       Living Environment    People in Home spouse    Current Living Arrangements home    Potentially Unsafe Housing Conditions none    Primary Care Provided by self    Provides Primary Care For no one    Family Caregiver if Needed spouse    Quality of Family Relationships unable to assess    Able to Return to Prior Arrangements yes       Resource/Environmental Concerns    Resource/Environmental Concerns none    Transportation Concerns none       Food Insecurity    Within the past 12 months, you worried that your food would run out before you got the money to buy more. Never true    Within the past 12 months, the food you bought just didn't last and you didn't have money to get more. Never true       Transition Planning    Patient/Family Anticipates Transition to home with family    Patient/Family Anticipated Services at Transition     Transportation Anticipated family or friend will provide       Discharge Needs Assessment    Readmission Within the Last 30 Days no previous admission in last 30 days    Equipment Currently Used at Home none    Concerns to be Addressed no discharge needs identified    Anticipated Changes Related to Illness none    Equipment Needed After Discharge walker, standard    Provided Post Acute Provider List? N/A    Provided Post Acute Provider Quality & Resource List? N/A                   Discharge Plan       Row Name 08/31/23 0952       Plan    Plan DCP    Roadmap to Recovery Yes    Patient/Family in Agreement with Plan unable to assess    Provided Post Acute Provider List? N/A    Provided Post Acute Provider Quality & Resource List? N/A    Plan Comments SW met with Pt. at bedside to complete DCP. Pt. states that he lives at home with his wife and was independent  without a device. Pt. is unsure of if he has a recent POA or AD documents. Pt. uses [facility] as his pharmacy. Pt. has not used HH services prior. Pt. may need a walker and HH services at discharge. KAILA recived a message from Pt. nurse asking SW to call wife to discuss discharge concerns. KAILA called Pt. wife Annalise at [phone], and that Pt. is unable to discharge to [facility] because he is observation Medicare. KAILA explained that HH services can be arranged and a walker can be ordered for Pt. KAILA expalined that Pt. will be evaluated by PT and OT and then KAILA will call back to dicuss more discharge plans. KAILA/HECTOR will continue to follow for discharge planning.      Row Name 08/31/23 0945       Plan    Plan DCP    Patient/Family in Agreement with Plan unable to assess    Provided Post Acute Provider List? N/A    Provided Post Acute Provider Quality & Resource List? N/A    Plan Comments KAILA met with Pt. at bedside to complete                  Continued Care and Services - Admitted Since 8/30/2023    Coordination has not been started for this encounter.          Demographic Summary       Row Name 08/31/23 0937       General Information    Admission Type observation    Arrived From emergency department    Required Notices Provided Observation Status Notice    Referral Source admission list    Reason for Consult discharge planning       Contact Information    Permission Granted to Share Info With     Contact Information Obtained for                    Functional Status       Row Name 08/31/23 0939       Functional Status    Usual Activity Tolerance good    Current Activity Tolerance good       Physical Activity    On average, how many days per week do you engage in moderate to strenuous exercise (like a brisk walk)? 0 days    On average, how many minutes do you engage in exercise at this level? 0 min    Number of minutes of exercise per week 0       Assessment of Health Literacy    How often do you have  someone help you read hospital materials? Occasionally    How often do you have problems learning about your medical condition because of difficulty understanding written information? Occasionally    How often do you have a problem understanding what is told to you about your medical condition? Occasionally    How confident are you filling out medical forms by yourself? Quite a bit    Health Literacy Good       Functional Status, IADL    Medications independent    Meal Preparation independent    Housekeeping independent    Laundry independent       Mental Status Summary    Recent Changes in Mental Status/Cognitive Functioning no changes       Employment/    Employment Status retired    Current or Previous Occupation not applicable           Current or Previous  Service none                   Psychosocial       Row Name 08/31/23 0940       Values/Beliefs    Spiritual, Cultural Beliefs, Moravian Practices, Values that Affect Care no       Mental Health    Little Interest or Pleasure in Doing Things 0-->not at all    Feeling Down, Depressed or Hopeless 0-->not at all       Coping/Stress    Major Change/Loss/Stressor illness    Patient Personal Strengths resilient    Sources of Support spouse    Techniques to [place]loan with Loss/Stress/Change none    Reaction to Health Status realistic    Understanding of Condition and Treatment adequate understanding of treatment       Developmental Stage (Eriksson's)    Developmental Stage Stage 8 (65 years-death/Late Adulthood) Integrity vs. Despair       C-SSRS (Recent)    Q1 Wished to be Dead (Past Month) no    Q2 Suicidal Thoughts (Past Month) no    Q6 Suicide Behavior (Lifetime) no       Violence Risk    Feels Like Hurting Others no    Previous Attempt to Harm Others no                   Abuse/Neglect       Row Name 08/31/23 2264       Personal Safety    Feels Unsafe at Home or Work/School no    Feels Threatened by Someone no    Does Anyone Try to Keep You  From Having Contact with Others or Doing Things Outside Your Home? no    Physical Signs of Abuse Present no                   Legal    No documentation.                  Substance Abuse    No documentation.                  Patient Forms    No documentation.                     Ivan Weaver

## 2023-08-31 NOTE — CASE MANAGEMENT/SOCIAL WORK
Address verified and pt agreeable to HH  Pcp: Dr. Bernstein is pcp  Hopefully they can get him into rehab

## 2023-08-31 NOTE — PLAN OF CARE
Goal Outcome Evaluation:           Progress: no change  Outcome Evaluation: VSS on room air.  No complaints during shift.  Disoriented to situation.  Enhanced droplet/contact precautions maintained.  MRI without contrast last night.  Will continue to monitor patient.

## 2023-09-01 ENCOUNTER — HOME HEALTH ADMISSION (OUTPATIENT)
Dept: HOME HEALTH SERVICES | Facility: HOME HEALTHCARE | Age: 73
End: 2023-09-01
Payer: MEDICARE

## 2023-09-01 VITALS
WEIGHT: 215.17 LBS | OXYGEN SATURATION: 95 % | RESPIRATION RATE: 20 BRPM | TEMPERATURE: 97.8 F | HEART RATE: 65 BPM | HEIGHT: 73 IN | SYSTOLIC BLOOD PRESSURE: 153 MMHG | BODY MASS INDEX: 28.52 KG/M2 | DIASTOLIC BLOOD PRESSURE: 84 MMHG

## 2023-09-01 LAB
ALBUMIN SERPL-MCNC: 3.7 G/DL (ref 3.5–5.2)
ALBUMIN/GLOB SERPL: 1.3 G/DL
ALP SERPL-CCNC: 89 U/L (ref 39–117)
ALT SERPL W P-5'-P-CCNC: 14 U/L (ref 1–41)
ANION GAP SERPL CALCULATED.3IONS-SCNC: 10.8 MMOL/L (ref 5–15)
AST SERPL-CCNC: 34 U/L (ref 1–40)
BILIRUB SERPL-MCNC: 0.5 MG/DL (ref 0–1.2)
BILIRUB UR QL STRIP: NEGATIVE
BUN SERPL-MCNC: 18 MG/DL (ref 8–23)
BUN/CREAT SERPL: 19.6 (ref 7–25)
CALCIUM SPEC-SCNC: 8.4 MG/DL (ref 8.6–10.5)
CHLORIDE SERPL-SCNC: 103 MMOL/L (ref 98–107)
CHOLEST SERPL-MCNC: 140 MG/DL (ref 0–200)
CLARITY UR: CLEAR
CO2 SERPL-SCNC: 25.2 MMOL/L (ref 22–29)
COLOR UR: YELLOW
CREAT SERPL-MCNC: 0.92 MG/DL (ref 0.76–1.27)
DEPRECATED RDW RBC AUTO: 42.1 FL (ref 37–54)
EGFRCR SERPLBLD CKD-EPI 2021: 86.7 ML/MIN/1.73
ERYTHROCYTE [DISTWIDTH] IN BLOOD BY AUTOMATED COUNT: 12.7 % (ref 12.3–15.4)
GLOBULIN UR ELPH-MCNC: 2.8 GM/DL
GLUCOSE SERPL-MCNC: 91 MG/DL (ref 65–99)
GLUCOSE UR STRIP-MCNC: NEGATIVE MG/DL
HBA1C MFR BLD: 5.7 % (ref 4.8–5.6)
HCT VFR BLD AUTO: 42.1 % (ref 37.5–51)
HDLC SERPL-MCNC: 33 MG/DL (ref 40–60)
HGB BLD-MCNC: 14.1 G/DL (ref 13–17.7)
HGB UR QL STRIP.AUTO: NEGATIVE
KETONES UR QL STRIP: ABNORMAL
LDLC SERPL CALC-MCNC: 88 MG/DL (ref 0–100)
LDLC/HDLC SERPL: 2.64 {RATIO}
LEUKOCYTE ESTERASE UR QL STRIP.AUTO: NEGATIVE
MCH RBC QN AUTO: 30.1 PG (ref 26.6–33)
MCHC RBC AUTO-ENTMCNC: 33.5 G/DL (ref 31.5–35.7)
MCV RBC AUTO: 90 FL (ref 79–97)
NITRITE UR QL STRIP: NEGATIVE
PH UR STRIP.AUTO: <=5 [PH] (ref 5–8)
PLATELET # BLD AUTO: 215 10*3/MM3 (ref 140–450)
PMV BLD AUTO: 9.7 FL (ref 6–12)
POTASSIUM SERPL-SCNC: 3.8 MMOL/L (ref 3.5–5.2)
PROT SERPL-MCNC: 6.5 G/DL (ref 6–8.5)
PROT UR QL STRIP: NEGATIVE
RBC # BLD AUTO: 4.68 10*6/MM3 (ref 4.14–5.8)
SODIUM SERPL-SCNC: 139 MMOL/L (ref 136–145)
SP GR UR STRIP: 1.02 (ref 1–1.03)
TRIGL SERPL-MCNC: 100 MG/DL (ref 0–150)
UROBILINOGEN UR QL STRIP: ABNORMAL
VLDLC SERPL-MCNC: 19 MG/DL (ref 5–40)
WBC NRBC COR # BLD: 7.07 10*3/MM3 (ref 3.4–10.8)

## 2023-09-01 PROCEDURE — 81003 URINALYSIS AUTO W/O SCOPE: CPT | Performed by: INTERNAL MEDICINE

## 2023-09-01 PROCEDURE — 83036 HEMOGLOBIN GLYCOSYLATED A1C: CPT | Performed by: INTERNAL MEDICINE

## 2023-09-01 PROCEDURE — 25010000002 ENOXAPARIN PER 10 MG: Performed by: INTERNAL MEDICINE

## 2023-09-01 PROCEDURE — 96372 THER/PROPH/DIAG INJ SC/IM: CPT

## 2023-09-01 PROCEDURE — 80061 LIPID PANEL: CPT | Performed by: INTERNAL MEDICINE

## 2023-09-01 PROCEDURE — G0378 HOSPITAL OBSERVATION PER HR: HCPCS

## 2023-09-01 PROCEDURE — 85027 COMPLETE CBC AUTOMATED: CPT | Performed by: INTERNAL MEDICINE

## 2023-09-01 PROCEDURE — 80053 COMPREHEN METABOLIC PANEL: CPT | Performed by: INTERNAL MEDICINE

## 2023-09-01 RX ORDER — LEVOTHYROXINE SODIUM 0.15 MG/1
150 TABLET ORAL
Qty: 30 TABLET | Refills: 0 | Status: SHIPPED | OUTPATIENT
Start: 2023-09-02

## 2023-09-01 RX ORDER — ASPIRIN 81 MG/1
81 TABLET ORAL DAILY
Qty: 30 TABLET | Refills: 0 | Status: SHIPPED | OUTPATIENT
Start: 2023-09-02

## 2023-09-01 RX ADMIN — ENOXAPARIN SODIUM 40 MG: 100 INJECTION SUBCUTANEOUS at 08:01

## 2023-09-01 RX ADMIN — ASPIRIN 81 MG: 81 TABLET, COATED ORAL at 08:01

## 2023-09-01 RX ADMIN — MEMANTINE 10 MG: 5 TABLET ORAL at 08:01

## 2023-09-01 RX ADMIN — LEVOTHYROXINE SODIUM 150 MCG: 150 TABLET ORAL at 06:34

## 2023-09-01 RX ADMIN — ATORVASTATIN CALCIUM 20 MG: 20 TABLET, FILM COATED ORAL at 08:01

## 2023-09-01 RX ADMIN — Medication 10 ML: at 08:02

## 2023-09-01 NOTE — CASE MANAGEMENT/SOCIAL WORK
Case Management Discharge Note      Final Note: Pt. will be returning home with his wife Katerin and [facility]. Pt. has a walk[facility] in his [facility] from [facility]. Pt. son will provide discharge transport.    Provided Post Acute Provider List?: N/A  Provided Post Acute Provider Quality & Resource List?: N/A    Selected Continued Care - Admitted Since 8/30/2023       Destination    No services have been selected for the patient.                Durable Medical Equipment Coordination complete.      Service Provider Selected Services Address Phone Fax Patient Preferred    [facility] [address] [place], [place] [address] [address] [phone] --       Internal Comment last updated by Ivan Weaver 9/1/2023 1038    walker                         Dialysis/Infusion    No services have been selected for the patient.                Home Medical Care Coordination complete.      Service Provider Selected Services Address Phone Fax Patient Preferred    [facility] [address] [facility] [id number] [phone] --              Therapy    No services have been selected for the patient.                Community Resources    No services have been selected for the patient.                Community & DME    No services have been selected for the patient.                    Transportation Services  Private: Car    Final Discharge Disposition Code: 06 - home with home health care

## 2023-09-01 NOTE — DISCHARGE SUMMARY
[facility]   INTERNAL MEDICINE DISCHARGE SUMMARY      Name:  Ramu Bustos   Age:  73 y.o.  Sex:  male  :  1950  MRN:  [id number]   Visit Number:  [id number]  Primary Care Physician:  Pérez Bernstein MD  Date of Discharge:  2023  Admission Date:  2023      Discharge Diagnosis:         COVID-19    Weakness    Repeated falls    Abnormality of gait and mobility    Dementia without behavioral disturbance    Hyperlipidemia  Hypothyroidism        Consults:     Consults       No orders found from 2023 to 2023.            Procedures Performed:                 Hospital Course:   The patient was admitted on 2023  Please see H&P for details on admission HPI and ROS.  73-year-old patient with past medical history of mild to moderate dementia who is ambulatory and able to answer simple questions, hyperlipidemia, who comes in to the ER repeatedly because of multiple falls.  He had a presentation in the ER yesterday where he was diagnosed to have COVID.  Though he was asymptomatic so he was discharged home.  Today morning he had fell 2 times and so wife brought him back to the ER and CT scan of the head was done.  It did not show any acute event so MRI has been ordered for possibility of findings of TIA as he has been weak and has been falling recently.  Prior to this he had been ambulatory has been eating well and has been compliant with his medications.  I had seen him also in the office because of history of fall and he had been on the Coreg which was held due to orthostasis at that time.  Patient has not been orthostatic but has been falling and unstable gait and is being admitted further for management and evaluation.  He did not have any symptoms but after admission he has COVID-positive and there was low-grade fever of 100.8.     Patient has been seen on .  He has had low-grade fever with temperature of 100.3.  Denies shortness of breath.  He is extremely weak and  has not got out of bed and physical therapy evaluation is pending today.  The family states that he cannot take care of them as they do have COVID extremely sick and he is high risk of falls and has been falling several times at home    Over the course of time his fever has completely resolved and he has been asymptomatic.  Walker has been used and PT evaluated him and found him to be stable to be discharged home with home physical therapy.  Also his labs was done and his thyroid dose was decreased from 175 mcg to 150 mcg based on the free T4.  He will be off the Coreg completely his blood pressure has stayed in the 140s and below and he will be discharged home on minimal medications.  I will see him back in the office Thursday at 1:45 PM and he is to be at home for the next 2 days due to isolation protocol with COVID and then he could be outside with 5 more days of masking in case if he has to go out  Vital Signs:     Temp:  [97.3 °F (36.3 °C)-98.6 °F (37 °C)] 98.6 °F (37 °C)  Heart Rate:  [58-82] 74  Resp:  [16-20] 18  BP: (129-149)/(72-87) 149/72    Physical Exam:     General Appearance:    Alert and cooperative, not in any acute distress.   Head:    Atraumatic and normocephalic, without obvious abnormality.   Eyes:            PERRLA,  No pallor. Extraocular movements are within normal limits.   Neck:   Supple,  No lymph glands, no bruit   Lungs:     Chest shape is normal. Breath sounds heard bilaterally equally.  No crackles or wheezing.     Heart:    Normal S1 and S2, no murmur,  No JVD   Abdomen:     Normal bowel sounds, no masses, no organomegaly. Soft     nontender, no guarding, no rebound tenderness   Extremities:   Moves all extremities well, no edema, no cyanosis,    Skin:   No  bruising or rash.   Neurologic:   Grossly nonfocal and moves all extremities.        Pertinent Lab Results:     Results from last 7 days   Lab Units 09/01/23  0615 08/31/23  0600 08/30/23  1010   SODIUM mmol/L 139 138 138    POTASSIUM mmol/L 3.8 3.8 3.8   CHLORIDE mmol/L 103 103 101   CO2 mmol/L 25.2 23.0 24.8   BUN mg/dL 18 19 16   CREATININE mg/dL 0.92 1.01 1.10   CALCIUM mg/dL 8.4* 8.3* 9.1   BILIRUBIN mg/dL 0.5 0.5 0.7   ALK PHOS U/L 89 92 107   ALT (SGPT) U/L 14 12 10   AST (SGOT) U/L 34 26 18   GLUCOSE mg/dL 91 98 103*     Results from last 7 days   Lab Units 09/01/23  0615 08/31/23  0600 08/30/23  1010   WBC 10*3/mm3 7.07 7.35 8.40   HEMOGLOBIN g/dL 14.1 13.3 14.6   HEMATOCRIT % 42.1 40.0 43.2   PLATELETS 10*3/mm3 215 210 229         No results found for: BLOODCX, URINECX, WOUNDCX, MRSACX    Pertinent Radiology Results:    Imaging Results (Most Recent)       Procedure Component Value Units Date/Time    XR Chest 1 View [[id number]] Collected: 08/31/23 1505     Updated: 08/31/23 1515    Narrative:      PROCEDURE: XR CHEST 1 VW-        HISTORY: fever unknown source; R26.9-Unspecified abnormalities of gait  and mobility; R53.1-Weakness; Z74.1-Need for assistance with personal  care; U07.1-COVID-19     COMPARISON: None.     FINDINGS: Apical lordotic view. The heart is normal in size. The  mediastinum is unremarkable. The lungs are clear. There is no  pneumothorax. There are no acute osseous abnormalities.       Impression:      No acute cardiopulmonary process.                       Images were reviewed, interpreted, and dictated by Dr. Alma Aquino MD  Transcribed by Rosy Kirkland PA-C.     This report was signed and finalized on 8/31/2023 3:13 PM by Alma Aquino MD.       MRI Brain Without Contrast [[id number]] Collected: 08/30/23 2148     Updated: 08/30/23 2149    Narrative:      FINAL REPORT    TECHNIQUE:  Multiplanar MR without contrast    CLINICAL HISTORY:  gait trouble, COVID POSITIVE, MULTIPLE FALLS WITH ABNORMAL GAIT.    FINDINGS:  Diffusion sequences show no signal abnormality to indicate acute  infarct.  Minimal scattered periventricular white matter signal  changes are seen compatible with moderate chronic  ischemic  gliotic disease.  Moderate generalized atrophy is present.  No mass, hemorrhage or edema is seen.  Ventricles are normal.  Major vascular flow voids are intact.      Impression:      1.  No mass, acute infarct or hydrocephalus  2.  Atrophy and  chronic ischemic white matter changes    Authenticated and Electronically Signed by ZIA Mcallister MD on  08/30/2023 09:48:48 PM                    Discharge Disposition:      Home-Health Care [facility]    Discharge Medication:         Discharge Medications        New Medications        Instructions Start Date   aspirin 81 MG EC tablet   81 mg, Oral, Daily   Start Date: September 2, 2023            Changes to Medications        Instructions Start Date   levothyroxine 150 MCG tablet  Commonly known as: SYNTHROID, LEVOTHROID  What changed:   medication strength  how much to take  when to take this  Another medication with the same name was removed. Continue taking this medication, and follow the directions you see here.   150 mcg, Oral, Every Early Morning   Start Date: September 2, 2023            Continue These Medications        Instructions Start Date   Namzaric 28-10 MG capsule sustained-release 24 hr  Generic drug: Memantine HCl-Donepezil HCl   28 mg, Oral, Daily      simvastatin 40 MG tablet  Commonly known as: ZOCOR   40 mg, Oral, Nightly             Stop These Medications      meloxicam 15 MG tablet  Commonly known as: MOBIC              Discharge Diet:             Follow-up Appointments:      No future appointments.  Additional Instructions for the Follow-ups that You Need to Schedule       Ambulatory Referral to Home Health (Hospital)   As directed      Face to Face Visit Date: 9/1/2023   Follow-up provider for Plan of Care?: I will be treating the patient on an ongoing basis.  Please send me the Plan of Care for signature.   Follow-up provider: IRMA GILL [[id number]]   Reason/Clinical Findings: History of multiple falls   Describe mobility limitations that make  leaving home difficult: He is homebound with multiple falls   Nursing/Therapeutic Services Requested: Physical Therapy Occupational Therapy Skilled Nursing   Skilled nursing orders: Other (Hypertension follow-up has he has been taken off the blood pressure medicine)   PT orders: Therapeutic exercise Strengthening Home safety assessment   Occupational orders: Strengthening Cognition Home safety assessment   Frequency: 1 Week 1                Test Results Pending at Discharge:      Pending Labs       Order Current Status    Blood Culture - Blood, Arm, Left In process               Pérez Bernstein MD  09/01/23  09:15 EDT    Time:  Discharge 29 min    Please note that portions of this note were completed with a voice recognition program.

## 2023-09-02 ENCOUNTER — HOME CARE VISIT (OUTPATIENT)
Dept: HOME HEALTH SERVICES | Facility: HOME HEALTHCARE | Age: 73
End: 2023-09-02
Payer: MEDICARE

## 2023-09-02 VITALS
TEMPERATURE: 98 F | OXYGEN SATURATION: 96 % | HEART RATE: 57 BPM | DIASTOLIC BLOOD PRESSURE: 78 MMHG | RESPIRATION RATE: 16 BRPM | SYSTOLIC BLOOD PRESSURE: 122 MMHG

## 2023-09-02 PROCEDURE — G0299 HHS/HOSPICE OF RN EA 15 MIN: HCPCS

## 2023-09-02 NOTE — HOME HEALTH
"SOC Note:    Home Health ordered for: disciplines SN/PT/OT    Reason for Hosp/Primary Dx/Co-morbidities: 73-year-old male w/ PMH of dementia, HTN. and HLD.  Presented to [facility] ED d/t multiple falls.  Diagnosed with Covid on 8.29.23 in the ED and discharged home d/t being asymptomatic.  Recently taken off of Coreg d/t orthostasis.  Discharged home on 9.1.23 w/ two more days of quarantine.     Focus of Care: Covid-19 education, cardiopulmonary assessments, medication education, BP monitoring d/t recent stop of BP medication d/t dizziness    Patient's goal(s):  \"To get stronger and no more falls.  To continue driving if possible.\"    Current Functional status/mobility/DME: Moderate assist.  Use of walker.     HB status/Living Arrangements: Homebound.  Lives with wife and two sons.     Skin Integrity/wound status: CDI.     Code Status: CPR    Fall Risk/Safety concerns: High fall risk.  Several falls over the past year.     Medication issues/Concerns:  N/A    Additional Problems/Concerns: N/A    SDOH Barriers (i.e. caregiver concerns, social isolation, transportation, food insecurity, environment, income etc.)/Need for MSW: N/A    Plan for next visit: Covid-19 education, cardiopulmonary assessments, medication education, BP monitoring d/t recent stop of BP medication d/t dizziness"

## 2023-09-05 LAB — BACTERIA SPEC AEROBE CULT: NORMAL

## 2023-09-06 ENCOUNTER — HOME CARE VISIT (OUTPATIENT)
Dept: HOME HEALTH SERVICES | Facility: HOME HEALTHCARE | Age: 73
End: 2023-09-06
Payer: MEDICARE

## 2023-09-06 VITALS
RESPIRATION RATE: 17 BRPM | HEART RATE: 61 BPM | SYSTOLIC BLOOD PRESSURE: 141 MMHG | TEMPERATURE: 97.3 F | OXYGEN SATURATION: 95 % | DIASTOLIC BLOOD PRESSURE: 81 MMHG

## 2023-09-06 VITALS
DIASTOLIC BLOOD PRESSURE: 73 MMHG | SYSTOLIC BLOOD PRESSURE: 132 MMHG | OXYGEN SATURATION: 95 % | HEART RATE: 60 BPM | RESPIRATION RATE: 16 BRPM

## 2023-09-06 PROCEDURE — G0152 HHCP-SERV OF OT,EA 15 MIN: HCPCS

## 2023-09-06 PROCEDURE — G0151 HHCP-SERV OF PT,EA 15 MIN: HCPCS

## 2023-09-06 NOTE — HOME HEALTH
73-year-old patient with past medical history of mild to moderate dementia who is ambulatory and able to answer simple questions, hyperlipidemia, who was admitted from the ER because of multiple falls and was found to be COVID- positive. Pt lives at home with wife and dependent son. Pt is currently ambulatory, limited by safety awareness and demonstrates good BLE strength. Pt and wife decline further skilled PT as they feel he has returned to his baseline function. Significant education provided on ambulation program and fall prevention including decreasing gait speed, avoiding shuffling and standing for a few seconds prior to walking. Pt and wife verbalize understanding and agreement.

## 2023-09-06 NOTE — HOME HEALTH
74 y/o M with recent hospital admission due to multiple falls and diagnosed to have COVID. Pt. seen today for OT evaluation and has no further skilled OT needs at this time secondary to family stated that pt. had returned to his baseline level of independence with supervision for safety with all ADLs at home. Famly also stated that patient will not be compliant with therapy exercises/declined further therapy at this time. Pt.s family demonstrated understanding of basic home safety and has all necessary DME needs at this time. Family also given recourses for alzheimers dementia, etc.  PMH:   mild to moderate dementia  Coronary artery disease   REPORTS HAD 2 CORONARY STENTS PLACED   Disease of thyroid gland   Fracture   HAND (UNSURE SIDE)   Heart attack 2014  History of heart artery stent 2012  Hypertension

## 2023-09-06 NOTE — Clinical Note
Pt. seen today for OT evaluation only secondary to Famly stated that patient will not be compliant with therapy exercises/declined further therapy at this time. Pt.s family demonstrated understanding of basic home safety and has all necessary DME needs at this time. Please re-consult if patient has a change/decline in status. Thanks!

## 2023-09-08 ENCOUNTER — HOME CARE VISIT (OUTPATIENT)
Dept: HOME HEALTH SERVICES | Facility: HOME HEALTHCARE | Age: 73
End: 2023-09-08
Payer: MEDICARE

## 2023-11-13 ENCOUNTER — OFFICE VISIT (OUTPATIENT)
Dept: INTERNAL MEDICINE | Facility: CLINIC | Age: 73
End: 2023-11-13
Payer: MEDICARE

## 2023-11-13 VITALS
SYSTOLIC BLOOD PRESSURE: 137 MMHG | TEMPERATURE: 97.7 F | OXYGEN SATURATION: 98 % | HEIGHT: 73 IN | BODY MASS INDEX: 28.1 KG/M2 | DIASTOLIC BLOOD PRESSURE: 86 MMHG | WEIGHT: 212 LBS | HEART RATE: 62 BPM

## 2023-11-13 DIAGNOSIS — F03.90 DEMENTIA WITHOUT BEHAVIORAL DISTURBANCE: Primary | ICD-10-CM

## 2023-11-13 DIAGNOSIS — E03.9 ACQUIRED HYPOTHYROIDISM: ICD-10-CM

## 2023-11-13 DIAGNOSIS — E78.2 MIXED HYPERLIPIDEMIA: ICD-10-CM

## 2023-11-13 LAB — TSH SERPL DL<=0.005 MIU/L-ACNC: 3.92 UIU/ML (ref 0.27–4.2)

## 2023-11-13 PROCEDURE — 1159F MED LIST DOCD IN RCRD: CPT | Performed by: INTERNAL MEDICINE

## 2023-11-13 PROCEDURE — 1160F RVW MEDS BY RX/DR IN RCRD: CPT | Performed by: INTERNAL MEDICINE

## 2023-11-13 PROCEDURE — 99214 OFFICE O/P EST MOD 30 MIN: CPT | Performed by: INTERNAL MEDICINE

## 2023-11-13 RX ORDER — DIPHENOXYLATE HYDROCHLORIDE AND ATROPINE SULFATE 2.5; .025 MG/1; MG/1
1 TABLET ORAL DAILY
COMMUNITY

## 2023-11-13 NOTE — PROGRESS NOTES
"Misael Bustos is a 73 y.o. male    HPI coming in to reestablish care here patient with a longstanding history of vascular dementia has had dyslipidemia with a history of hypothyroidism he has been on Aricept with Namenda combo without any significant behavioral disturbances.  There has been some concerns about his gait has had an occasional fall.  Has mild tremors.  No alcohol use he lives with his wife and son    The following portions of the patient's history were reviewed and updated as appropriate: allergies, current medications, past family history, past medical history, past social history, past surgical history, and problem list.     Review of Systems   Constitutional: Negative.  Negative for activity change, appetite change, fatigue and fever.   HENT:  Negative for congestion, ear discharge, ear pain and trouble swallowing.    Eyes:  Negative for photophobia and visual disturbance.   Respiratory:  Negative for cough and shortness of breath.    Cardiovascular:  Negative for chest pain and palpitations.   Gastrointestinal:  Negative for abdominal distention, abdominal pain, constipation, diarrhea, nausea and vomiting.   Endocrine: Negative.    Genitourinary:  Negative for dysuria, hematuria and urgency.   Musculoskeletal:  Positive for arthralgias and gait problem. Negative for back pain, joint swelling and myalgias.   Skin:  Negative for color change and rash.   Allergic/Immunologic: Negative.    Neurological:  Negative for dizziness, weakness, light-headedness and headaches.   Hematological:  Negative for adenopathy. Does not bruise/bleed easily.   Psychiatric/Behavioral:  Positive for confusion and decreased concentration. Negative for agitation and dysphoric mood. The patient is not nervous/anxious.        Visit Vitals  /86   Pulse 62   Temp 97.7 °F (36.5 °C)   Ht 185.4 cm (72.99\")   Wt 96.2 kg (212 lb)   SpO2 98%   BMI 27.98 kg/m²       Objective  Physical Exam  Constitutional:       " General: He is not in acute distress.     Appearance: He is well-developed.   HENT:      Nose: Nose normal.   Eyes:      General: No scleral icterus.     Conjunctiva/sclera: Conjunctivae normal.   Neck:      Thyroid: No thyromegaly.      Trachea: No tracheal deviation.   Cardiovascular:      Rate and Rhythm: Normal rate and regular rhythm.      Heart sounds: No murmur heard.     No friction rub.   Pulmonary:      Effort: No respiratory distress.      Breath sounds: No wheezing or rales.   Abdominal:      General: There is no distension.      Palpations: Abdomen is soft. There is no mass.      Tenderness: There is no abdominal tenderness. There is no guarding.   Musculoskeletal:         General: Deformity present. Normal range of motion.   Lymphadenopathy:      Cervical: No cervical adenopathy.   Skin:     General: Skin is warm and dry.      Findings: No erythema or rash.   Neurological:      Mental Status: He is alert and oriented to person, place, and time.      Cranial Nerves: No cranial nerve deficit.      Coordination: Coordination normal.      Deep Tendon Reflexes: Reflexes are normal and symmetric.   Psychiatric:         Behavior: Behavior normal.       Diagnoses and all orders for this visit:    Dementia without behavioral disturbance appears stable.  Did not show an abnormal gait or walk.  Doubt he has Lewy body dementia.  Has had slow progression of his memory loss over the last 5 years continue with current management recent MRI of brain reviewed    Mixed hyperlipidemia on dietary restrictions and simvastatin follow lipid profile    Acquired hypothyroidism clinically appears euthyroid we will check TSH today    Other orders  -     Cholecalciferol (Vitamin D-3) 125 MCG (5000 UT) tablet; Take  by mouth.  -     multivitamin (MULTI VITAMIN DAILY PO); Take 1 tablet by mouth Daily.

## 2024-01-23 ENCOUNTER — TELEPHONE (OUTPATIENT)
Dept: INTERNAL MEDICINE | Facility: CLINIC | Age: 74
End: 2024-01-23
Payer: MEDICARE

## 2024-01-23 NOTE — TELEPHONE ENCOUNTER
Caller: Katerin Bustos    Relationship: Emergency Contact    Best call back number: [phone]     What is the best time to reach you: ANY    Who are you requesting to speak with (clinical staff, provider,  specific staff member): NURSE    Do you know the name of the person who called: WIFE    What was the call regarding: PATIENT HAS BEEN LIGHTHEADED FOR ABOUT AN HOUR IN THE MORNINGS.  IS THIS A SYMPTOM OF ALZHEIMER'S OR SOMETHING ELSE?  HAS FALLEN TWICE IN THE LAST COUPLE WEEKS.  HIS BALANCE AND GAIT HAVE GOTTEN OFF.      Is it okay if the provider responds through MyChart: PHONE CALL PLEASE

## 2024-01-25 ENCOUNTER — OFFICE VISIT (OUTPATIENT)
Dept: INTERNAL MEDICINE | Facility: CLINIC | Age: 74
End: 2024-01-25
Payer: MEDICARE

## 2024-01-25 VITALS
BODY MASS INDEX: 28.89 KG/M2 | HEART RATE: 66 BPM | RESPIRATION RATE: 18 BRPM | HEIGHT: 73 IN | TEMPERATURE: 97.8 F | WEIGHT: 218 LBS | SYSTOLIC BLOOD PRESSURE: 136 MMHG | DIASTOLIC BLOOD PRESSURE: 82 MMHG | OXYGEN SATURATION: 96 %

## 2024-01-25 DIAGNOSIS — E53.8 B12 DEFICIENCY: ICD-10-CM

## 2024-01-25 DIAGNOSIS — F03.90 DEMENTIA WITHOUT BEHAVIORAL DISTURBANCE: Primary | ICD-10-CM

## 2024-01-25 LAB
ALBUMIN SERPL-MCNC: 4.4 G/DL (ref 3.5–5.2)
ALBUMIN/GLOB SERPL: 1.6 G/DL
ALP SERPL-CCNC: 109 U/L (ref 39–117)
ALT SERPL-CCNC: 17 U/L (ref 1–41)
AST SERPL-CCNC: 22 U/L (ref 1–40)
BILIRUB SERPL-MCNC: 0.4 MG/DL (ref 0–1.2)
BUN SERPL-MCNC: 10 MG/DL (ref 8–23)
BUN/CREAT SERPL: 9 (ref 7–25)
CALCIUM SERPL-MCNC: 9.2 MG/DL (ref 8.6–10.5)
CHLORIDE SERPL-SCNC: 103 MMOL/L (ref 98–107)
CO2 SERPL-SCNC: 28.1 MMOL/L (ref 22–29)
CREAT SERPL-MCNC: 1.11 MG/DL (ref 0.76–1.27)
EGFRCR SERPLBLD CKD-EPI 2021: 69.2 ML/MIN/1.73
ERYTHROCYTE [DISTWIDTH] IN BLOOD BY AUTOMATED COUNT: 13.7 % (ref 12.3–15.4)
GLOBULIN SER CALC-MCNC: 2.7 GM/DL
GLUCOSE SERPL-MCNC: 105 MG/DL (ref 65–99)
HCT VFR BLD AUTO: 47 % (ref 37.5–51)
HGB BLD-MCNC: 15.6 G/DL (ref 13–17.7)
MCH RBC QN AUTO: 30.5 PG (ref 26.6–33)
MCHC RBC AUTO-ENTMCNC: 33.2 G/DL (ref 31.5–35.7)
MCV RBC AUTO: 91.8 FL (ref 79–97)
PLATELET # BLD AUTO: 316 10*3/MM3 (ref 140–450)
POTASSIUM SERPL-SCNC: 4.8 MMOL/L (ref 3.5–5.2)
PROT SERPL-MCNC: 7.1 G/DL (ref 6–8.5)
RBC # BLD AUTO: 5.12 10*6/MM3 (ref 4.14–5.8)
SODIUM SERPL-SCNC: 139 MMOL/L (ref 136–145)
VIT B12 SERPL-MCNC: 627 PG/ML (ref 211–946)
WBC # BLD AUTO: 9.03 10*3/MM3 (ref 3.4–10.8)

## 2024-01-25 RX ORDER — ROPINIROLE 0.25 MG/1
0.12 TABLET, FILM COATED ORAL NIGHTLY
Qty: 60 TABLET | Refills: 1 | Status: SHIPPED | OUTPATIENT
Start: 2024-01-25

## 2024-01-25 NOTE — PROGRESS NOTES
Subjective   Ramu Bustos is a 73 y.o. male who presents with his wife today after 2 recent falls.  He was walking to the mailbox for exercise, when he fell in his driveway.  No significant injuries occurred however this is a progression of an ongoing issue for him.  He has started feeling dizzy and lightheaded in the mornings, but when he falls he also has the same complaints.  He denies chest pain, shortness of breath, or other cardiac type symptoms at the time of his falls.  His wife also believes that his memory deficits have progressively worsened.  She denies any personality changes, wandering episodes, or behavioral issues.        The following portions of the patient's history were reviewed and updated as appropriate: allergies, current medications, past family history, past medical history, past social history, past surgical history, and problem list.    Review of Systems   Constitutional:  Negative for activity change, chills and fever.   HENT: Negative.     Eyes: Negative.    Respiratory:  Negative for cough, chest tightness and shortness of breath.    Cardiovascular:  Negative for chest pain, palpitations and leg swelling.   Gastrointestinal:  Negative for constipation, diarrhea and nausea.   Endocrine: Negative.    Genitourinary: Negative.    Musculoskeletal:  Positive for gait problem. Negative for arthralgias and back pain.   Skin:  Negative for rash and wound.   Allergic/Immunologic: Negative.    Neurological:  Positive for dizziness, tremors and light-headedness. Negative for seizures, syncope, speech difficulty, weakness, numbness and headaches.   Hematological: Negative.    Psychiatric/Behavioral:  Positive for confusion. Negative for agitation, behavioral problems, hallucinations and sleep disturbance. The patient is not nervous/anxious.        Objective   Physical Exam  Constitutional:       General: He is not in acute distress.  HENT:      Head: Normocephalic and atraumatic.      Nose: Nose  normal.   Eyes:      General: No scleral icterus.     Pupils: Pupils are equal, round, and reactive to light.   Cardiovascular:      Rate and Rhythm: Normal rate and regular rhythm.      Pulses: Normal pulses.      Heart sounds: Normal heart sounds. No murmur heard.     No gallop.      Comments: Orthostatic blood pressure testin/97 laying, 155/97 sitting, 154/97 standing. No lightheadedness or dizziness elicited.  Pulmonary:      Effort: Pulmonary effort is normal. No respiratory distress.      Breath sounds: Normal breath sounds. No wheezing.   Abdominal:      General: There is no distension.      Palpations: Abdomen is soft.      Tenderness: There is no abdominal tenderness.   Musculoskeletal:         General: Normal range of motion.      Cervical back: Normal range of motion.      Comments: Bilateral lower extremity strength 5/5. Mild cogwheel rigidity in upper extremities, right more than left.   Skin:     General: Skin is warm.      Findings: No bruising.   Neurological:      General: No focal deficit present.      Mental Status: He is alert. Mental status is at baseline.      Motor: No weakness.      Gait: Gait abnormal.   Psychiatric:         Attention and Perception: Attention and perception normal. He does not perceive auditory or visual hallucinations.         Mood and Affect: Mood and affect normal.         Speech: Speech normal.         Behavior: Behavior normal.         Thought Content: Thought content normal.         Cognition and Memory: Cognition is not impaired. Memory is impaired.         Judgment: Judgment normal.       Assessment & Plan   Diagnoses and all orders for this visit:    1. Dementia without behavioral disturbance (Primary)  -     Comprehensive Metabolic Panel  -     CBC (No Diff)  -Admits to shuffling gait, occasional tremors, and worsening memory deficits which are all more pronounced in the morning  -Previously saw  neurology, MRI from August significant for vascular  changes  -Start ropinirole 1/2 tablet twice daily for Parkinsonian symptoms  -On Namzaric, will take nightly before bed to see if this helps with morning brain fog  - Stable and unchanged synthroid dosing. No other concerns for medication side-effects  - Will decrease caffeine intake    2. B12 deficiency for history of dementia  -     Vitamin B12  - Recheck due to recent falls    Other orders  -     rOPINIRole (REQUIP) 0.25 MG tablet; Take 0.5 tablets by mouth Every Night. Take 1 hour before bedtime.  Dispense: 60 tablet; Refill: 1       Radha Quevedo OMS-IV

## 2024-02-21 ENCOUNTER — TELEPHONE (OUTPATIENT)
Dept: INTERNAL MEDICINE | Facility: CLINIC | Age: 74
End: 2024-02-21
Payer: MEDICARE

## 2024-02-21 NOTE — TELEPHONE ENCOUNTER
Caller: Katerin Bustos    Relationship: Emergency Contact    Best call back number: [phone]     What is the best time to reach you: ANY    Who are you requesting to speak with (clinical staff, provider,  specific staff member): DR BORGES    What was the call regarding: KATERIN WOULD LIKE TO DISCUSS LEI' ALZHEIMER'S, WHETHER HE SHOULD BE DRIVING AND HIS BALANCE IS OFF MORE THAN USUAL.      Is it okay if the provider responds through MyChart: CALL

## 2024-02-22 RX ORDER — FLUDROCORTISONE ACETATE 0.1 MG/1
0.1 TABLET ORAL 2 TIMES DAILY
Qty: 30 TABLET | Refills: 1 | Status: SHIPPED | OUTPATIENT
Start: 2024-02-22 | End: 2024-02-23 | Stop reason: SDUPTHER

## 2024-02-22 NOTE — TELEPHONE ENCOUNTER
Discussed with patient's wife.  Trial of Florinef.  Encourage adequate fluids.  Follow BP readings

## 2024-02-23 RX ORDER — FLUDROCORTISONE ACETATE 0.1 MG/1
0.1 TABLET ORAL 2 TIMES DAILY
Qty: 30 TABLET | Refills: 1 | Status: SHIPPED | OUTPATIENT
Start: 2024-02-23 | End: 2024-02-24 | Stop reason: SDUPTHER

## 2024-02-24 RX ORDER — FLUDROCORTISONE ACETATE 0.1 MG/1
0.1 TABLET ORAL 2 TIMES DAILY
Qty: 30 TABLET | Refills: 1 | Status: SHIPPED | OUTPATIENT
Start: 2024-02-24

## 2024-05-16 ENCOUNTER — OFFICE VISIT (OUTPATIENT)
Dept: INTERNAL MEDICINE | Facility: CLINIC | Age: 74
End: 2024-05-16
Payer: MEDICARE

## 2024-05-16 VITALS
WEIGHT: 206 LBS | HEART RATE: 74 BPM | RESPIRATION RATE: 12 BRPM | DIASTOLIC BLOOD PRESSURE: 86 MMHG | SYSTOLIC BLOOD PRESSURE: 126 MMHG | TEMPERATURE: 97.5 F | OXYGEN SATURATION: 98 % | BODY MASS INDEX: 27.3 KG/M2 | HEIGHT: 73 IN

## 2024-05-16 DIAGNOSIS — E78.2 MIXED HYPERLIPIDEMIA: ICD-10-CM

## 2024-05-16 DIAGNOSIS — F03.90 DEMENTIA WITHOUT BEHAVIORAL DISTURBANCE: Primary | ICD-10-CM

## 2024-05-16 DIAGNOSIS — E03.9 ACQUIRED HYPOTHYROIDISM: ICD-10-CM

## 2024-05-16 DIAGNOSIS — R26.9 ABNORMALITY OF GAIT AND MOBILITY: ICD-10-CM

## 2024-05-16 PROBLEM — U07.1 COVID-19: Status: RESOLVED | Noted: 2023-08-30 | Resolved: 2024-05-16

## 2024-05-16 PROBLEM — R53.1 WEAKNESS: Status: RESOLVED | Noted: 2023-08-30 | Resolved: 2024-05-16

## 2024-05-16 NOTE — PROGRESS NOTES
The ABCs of the Annual Wellness Visit  Subsequent Medicare Wellness Visit    Subjective    Ramu Bustos is a 74 y.o. male who presents for a Subsequent Medicare Wellness Visit.    The following portions of the patient's history were reviewed and   updated as appropriate: allergies, current medications, past family history, past medical history, past social history, past surgical history, and problem list.    Compared to one year ago, the patient feels his physical   health is worse.    Compared to one year ago, the patient feels his mental   health is worse.    Recent Hospitalizations:  This patient has had a [facility] admission record on file within the last 365 days.    Current Medical Providers:  Patient Care Team:  Aníbal Rizzo MD as PCP - General (Internal Medicine)    Outpatient Medications Prior to Visit   Medication Sig Dispense Refill    aspirin 81 MG EC tablet Take 1 tablet by mouth Daily. 30 tablet 0    Cholecalciferol (Vitamin D-3) 125 MCG (5000 UT) tablet Take  by mouth.      fludrocortisone 0.1 MG tablet Take 1 tablet by mouth 2 (Two) Times a Day. 30 tablet 1    levothyroxine (SYNTHROID, LEVOTHROID) 150 MCG tablet Take 1 tablet by mouth Every Morning. 30 tablet 0    multivitamin (MULTI VITAMIN DAILY PO) Take 1 tablet by mouth Daily.      NAMZARIC 28-10 MG capsule sustained-release 24 hr Take 28 mg by mouth Daily. 90 capsule 3    simvastatin (ZOCOR) 40 MG tablet Take 1 tablet by mouth Every Night. Indications: High Amount of Fats in the Blood       No facility-administered medications prior to visit.       No opioid medication identified on active medication list. I have reviewed chart for other potential  high risk medication/s and harmful drug interactions in the elderly.        Aspirin is on active medication list. Aspirin use is indicated based on review of current medical condition/s. Pros and cons of this therapy have been discussed today. Benefits of this medication outweigh  "potential harm.  Patient has been encouraged to continue taking this medication.  .      Patient Active Problem List   Diagnosis    Colon cancer screening    Repeated falls    Abnormality of gait and mobility    Dementia without behavioral disturbance    Hyperlipidemia     Advance Care Planning   Advance Care Planning     Advance Directive is not on file.  ACP discussion was held with the patient during this visit. Patient does not have an advance directive, information provided.     Objective    Vitals:    24 0929   BP: 126/86   Pulse: 74   Resp: 12   Temp: 97.5 °F (36.4 °C)   TempSrc: Infrared   SpO2: 98%   Weight: 93.4 kg (206 lb)   Height: 185.4 cm (73\")   PainSc: 0-No pain     Estimated body mass index is 27.18 kg/m² as calculated from the following:    Height as of this encounter: 185.4 cm (73\").    Weight as of this encounter: 93.4 kg (206 lb).    BMI is >= 25 and <30. (Overweight) The following options were offered after discussion;: nutrition counseling/recommendations      Does the patient have evidence of cognitive impairment? Yes          HEALTH RISK ASSESSMENT    Smoking Status:  Social History     Tobacco Use   Smoking Status Former    Current packs/day: 0.00    Average packs/day: 1 pack/day for 13.0 years (13.0 ttl pk-yrs)    Types: Cigarettes    Start date:     Quit date:     Years since quittin.4   Smokeless Tobacco Never     Alcohol Consumption:  Social History     Substance and Sexual Activity   Alcohol Use Never     Fall Risk Screen:    SHAYE Fall Risk Assessment was completed, and patient is at LOW risk for falls.Assessment completed on:2024    Depression Screenin/16/2024     9:31 AM   PHQ-2/PHQ-9 Depression Screening   Little Interest or Pleasure in Doing Things 0-->not at all   Feeling Down, Depressed or Hopeless 0-->not at all   PHQ-9: Brief Depression Severity Measure Score 0       Health Habits and Functional and Cognitive Screenin/16/2024     9:31 " AM   Functional & Cognitive Status   Do you have difficulty preparing food and eating? No   Do you have difficulty bathing yourself, getting dressed or grooming yourself? No   Do you have difficulty using the toilet? No   Do you have difficulty moving around from place to place? No   Do you have trouble with steps or getting out of a bed or a chair? No   Current Diet Well Balanced Diet   Dental Exam Up to date   Eye Exam Up to date   Exercise (times per week) 0 times per week   Current Exercises Include No Regular Exercise   Do you need help using the phone?  No   Are you deaf or do you have serious difficulty hearing?  No   Do you need help to go to places out of walking distance? No   Do you need help shopping? No   Do you need help preparing meals?  No   Do you need help with housework?  No   Do you need help with laundry? No   Do you need help taking your medications? No   Do you need help managing money? No   Do you ever drive or ride in a car without wearing a seat belt? No   Have you felt unusual stress, anger or loneliness in the last month? No   Who do you live with? Spouse   If you need help, do you have trouble finding someone available to you? No   Have you been bothered in the last four weeks by sexual problems? No   Do you have difficulty concentrating, remembering or making decisions? No       Age-appropriate Screening Schedule:  Refer to the list below for future screening recommendations based on patient's age, sex and/or medical conditions. Orders for these recommended tests are listed in the plan section. The patient has been provided with a written plan.    Health Maintenance   Topic Date Due    TDAP/TD VACCINES (1 - Tdap) Never done    HEPATITIS C SCREENING  Never done    BMI FOLLOWUP  11/12/2020    COVID-19 Vaccine (5 - 2023-24 season) 08/05/2024 (Originally 9/1/2023)    RSV Vaccine - Adults (1 - 1-dose 60+ series) 05/16/2025 (Originally 2/20/2008)    ZOSTER VACCINE (1 of 2) 09/05/2029  (Originally 2/20/1998)    INFLUENZA VACCINE  08/01/2024    LIPID PANEL  09/01/2024    ANNUAL WELLNESS VISIT  12/04/2024    COLORECTAL CANCER SCREENING  06/15/2027    Pneumococcal Vaccine 65+  Completed                  CMS Preventative Services Quick Reference  Risk Factors Identified During Encounter  Fall Risk-High or Moderate: Discussed Fall Prevention in the home and Sit to Stand Exercise Information Shared in After Visit Summary  Polypharmacy: Medication List reviewed and Medications are appropriate for patient  The above risks/problems have been discussed with the patient.  Pertinent information has been shared with the patient in the After Visit Summary.  An After Visit Summary and PPPS were made available to the patient.    Follow Up:   Next Medicare Wellness visit to be scheduled in 1 year.       Additional E&M Note during same encounter follows:  Patient has multiple medical problems which are significant and separately identifiable that require additional work above and beyond the Medicare Wellness Visit.      Chief Complaint  Medicare Wellness-subsequent (No new issues/concerns)    Subjective        HPI  Ramu Bustos is also being seen today for increasing ataxia and falls.  He has a history of dementia his wife is accompanying him today and giving us some of the history since patient is a poor historian.  He has not been very active at home    Review of Systems   Constitutional:  Positive for fatigue. Negative for activity change, appetite change and fever.   HENT:  Negative for congestion, ear discharge, ear pain and trouble swallowing.    Eyes:  Negative for photophobia and visual disturbance.   Respiratory:  Negative for cough and shortness of breath.    Cardiovascular:  Negative for chest pain and palpitations.   Gastrointestinal:  Negative for abdominal distention, constipation, diarrhea, nausea and vomiting.   Genitourinary:  Negative for dysuria, hematuria and urgency.   Musculoskeletal:   "Positive for arthralgias and gait problem. Negative for back pain, joint swelling and myalgias.   Skin:  Negative for color change and rash.   Neurological:  Positive for confusion. Negative for dizziness, weakness and light-headedness.   Hematological:  Negative for adenopathy. Does not bruise/bleed easily.   Psychiatric/Behavioral:  Positive for decreased concentration. Negative for agitation and dysphoric mood. The patient is nervous/anxious.        Objective   Vital Signs:  /86   Pulse 74   Temp 97.5 °F (36.4 °C) (Infrared)   Resp 12   Ht 185.4 cm (73\")   Wt 93.4 kg (206 lb)   SpO2 98%   BMI 27.18 kg/m²     Physical Exam  Constitutional:       General: He is not in acute distress.     Appearance: He is well-developed.   HENT:      Nose: Nose normal.   Eyes:      General: No scleral icterus.     Conjunctiva/sclera: Conjunctivae normal.   Neck:      Thyroid: No thyromegaly.      Trachea: No tracheal deviation.   Cardiovascular:      Rate and Rhythm: Normal rate and regular rhythm.      Heart sounds: No murmur heard.     No friction rub.   Pulmonary:      Effort: No respiratory distress.      Breath sounds: No wheezing or rales.   Abdominal:      General: There is no distension.      Palpations: Abdomen is soft. There is no mass.      Tenderness: There is no abdominal tenderness. There is no guarding.   Musculoskeletal:         General: Deformity present. Normal range of motion.   Lymphadenopathy:      Cervical: No cervical adenopathy.   Skin:     General: Skin is warm and dry.      Findings: No erythema or rash.   Neurological:      Mental Status: He is alert and oriented to person, place, and time.      Cranial Nerves: No cranial nerve deficit.      Motor: Weakness present.      Coordination: Coordination normal.      Gait: Gait abnormal.      Deep Tendon Reflexes: Reflexes are normal and symmetric.   Psychiatric:         Behavior: Behavior normal.                       Assessment and Plan "   Diagnoses and all orders for this visit:    1. Dementia without behavioral disturbance (Primary) behaviorally stable continue with Namzaric    2. Mixed hyperlipidemia on statins discussed dietary restrictions    3. Acquired hypothyroidism clinically euthyroid follow TSH    4. Abnormality of gait and mobility demonstrated exercises for patient in wife's presence.  Referral for physical therapy also             Follow Up   No follow-ups on file.  Patient was given instructions and counseling regarding his condition or for health maintenance advice. Please see specific information pulled into the AVS if appropriate.

## 2024-06-10 ENCOUNTER — TELEPHONE (OUTPATIENT)
Dept: INTERNAL MEDICINE | Facility: CLINIC | Age: 74
End: 2024-06-10
Payer: MEDICARE

## 2024-06-10 DIAGNOSIS — R26.9 ABNORMALITY OF GAIT AND MOBILITY: Primary | ICD-10-CM

## 2024-06-10 NOTE — TELEPHONE ENCOUNTER
Spoke with wife. This is for ambulation/balance. Has trouble getting around the home and getting in and out of car. Would like 5x a week rather than the 2 they had been giving. I told her that is likely what insurance is approving.

## 2024-06-10 NOTE — TELEPHONE ENCOUNTER
Caller: Ramu Bustos    Relationship: Self    Best call back number: [phone]      What was the call regarding: PATIENT'S WIFE CALLED TO GET AN UPDATED ORDER PHYSICAL THERAPY.  SHE STATED THAT THIS NEEDS TO GO TO [facility] [place] [facility]YS[facility]ERAP[facility].  SHE STATED THAT THE ORDER IS GOING TO .

## 2024-09-05 DIAGNOSIS — R26.9 ABNORMALITY OF GAIT AND MOBILITY: Primary | ICD-10-CM

## 2024-09-10 ENCOUNTER — TELEPHONE (OUTPATIENT)
Dept: INTERNAL MEDICINE | Facility: CLINIC | Age: 74
End: 2024-09-10
Payer: MEDICARE

## 2024-09-10 NOTE — TELEPHONE ENCOUNTER
I spoke with wife and let her know that referral usually take at least 2 weeks to complete and that when it was they would hear about scheduling.

## 2024-09-10 NOTE — TELEPHONE ENCOUNTER
Caller: Ramu Bustos    Relationship: Self    Best call back number: [phone]     What was the call regarding: PATIENT WIFE STATED THAT THE REFERRAL WAS SENT TO [facility] AND THEY ARE SAYING THAT THEY HAVE NOT RECIEVED THE REFERRAL. THEY HAD ADVISED THE PATIENT TO HAVE THE PROVIDER CALL THE TO GET THE INFORMATION. THE BEST NUMBER FOR DR. BORGES TO CALL THEM [phone] FAX: [phone]   PLEASE CALL PATIENT OR WIFE TO DISCUSS WHEN THIS IS GOING TO BE DONE .

## 2024-09-11 ENCOUNTER — TELEPHONE (OUTPATIENT)
Dept: INTERNAL MEDICINE | Facility: CLINIC | Age: 74
End: 2024-09-11
Payer: MEDICARE

## 2024-09-11 RX ORDER — LEVOTHYROXINE SODIUM 150 UG/1
150 TABLET ORAL
Qty: 90 TABLET | Refills: 3 | Status: SHIPPED | OUTPATIENT
Start: 2024-09-11 | End: 2024-09-11 | Stop reason: SDUPTHER

## 2024-09-11 RX ORDER — LEVOTHYROXINE SODIUM 150 UG/1
150 TABLET ORAL
Qty: 90 TABLET | Refills: 3 | Status: SHIPPED | OUTPATIENT
Start: 2024-09-11

## 2024-10-11 ENCOUNTER — OFFICE VISIT (OUTPATIENT)
Dept: INTERNAL MEDICINE | Facility: CLINIC | Age: 74
End: 2024-10-11
Payer: MEDICARE

## 2024-10-11 VITALS
SYSTOLIC BLOOD PRESSURE: 150 MMHG | DIASTOLIC BLOOD PRESSURE: 68 MMHG | HEIGHT: 73 IN | TEMPERATURE: 97.1 F | BODY MASS INDEX: 27.43 KG/M2 | OXYGEN SATURATION: 98 % | RESPIRATION RATE: 16 BRPM | HEART RATE: 58 BPM | WEIGHT: 207 LBS

## 2024-10-11 DIAGNOSIS — E78.2 MIXED HYPERLIPIDEMIA: ICD-10-CM

## 2024-10-11 DIAGNOSIS — F03.90 DEMENTIA WITHOUT BEHAVIORAL DISTURBANCE: Primary | ICD-10-CM

## 2024-10-11 DIAGNOSIS — E03.9 ACQUIRED HYPOTHYROIDISM: ICD-10-CM

## 2024-10-11 NOTE — PROGRESS NOTES
"Subjective  Ramu Bustos is a 74 y.o. male    HPI coming in for follow-up he is accompanied by his wife patient has significant short-term memory deficit some of the history provided by the wife who is accompanying him.  Has hypothyroidism and dyslipidemia history of significant short-term memory deficit with movement disorder has an unsteady gait has had a few falls currently without any pain or deformity.  Now using a walker with significant improvement has undergone some home physical therapy    The following portions of the patient's history were reviewed and updated as appropriate: allergies, current medications, past family history, past medical history, past social history, past surgical history, and problem list.     Review of Systems   Constitutional:  Positive for fatigue. Negative for activity change, appetite change and fever.   HENT:  Negative for congestion, ear discharge, ear pain and trouble swallowing.    Eyes:  Negative for photophobia and visual disturbance.   Respiratory:  Negative for cough and shortness of breath.    Cardiovascular:  Negative for chest pain and palpitations.   Gastrointestinal:  Negative for abdominal distention, abdominal pain, constipation, diarrhea, nausea and vomiting.   Endocrine: Negative.    Genitourinary:  Negative for dysuria, hematuria and urgency.   Musculoskeletal:  Positive for arthralgias and gait problem. Negative for back pain, joint swelling and myalgias.   Skin:  Negative for color change and rash.   Allergic/Immunologic: Negative.    Neurological:  Negative for dizziness, weakness, light-headedness and headaches.   Hematological:  Negative for adenopathy. Does not bruise/bleed easily.   Psychiatric/Behavioral:  Positive for confusion and decreased concentration. Negative for agitation and dysphoric mood. The patient is not nervous/anxious.        Visit Vitals  /68   Pulse 58   Temp 97.1 °F (36.2 °C)   Resp 16   Ht 185.4 cm (73\")   Wt 93.9 kg (207 lb) "   SpO2 98%   BMI 27.31 kg/m²       Objective  Physical Exam  Constitutional:       General: He is not in acute distress.     Appearance: He is well-developed.   HENT:      Nose: Nose normal.   Eyes:      General: No scleral icterus.     Conjunctiva/sclera: Conjunctivae normal.   Neck:      Thyroid: No thyromegaly.      Trachea: No tracheal deviation.   Cardiovascular:      Rate and Rhythm: Normal rate and regular rhythm.      Heart sounds: No murmur heard.     No friction rub.   Pulmonary:      Effort: No respiratory distress.      Breath sounds: No wheezing or rales.   Abdominal:      General: There is no distension.      Palpations: Abdomen is soft. There is no mass.      Tenderness: There is no abdominal tenderness. There is no guarding.   Musculoskeletal:         General: Deformity present. Normal range of motion.   Lymphadenopathy:      Cervical: No cervical adenopathy.   Skin:     General: Skin is warm and dry.      Findings: No erythema or rash.   Neurological:      Mental Status: He is alert and oriented to person, place, and time.      Cranial Nerves: No cranial nerve deficit.      Coordination: Coordination abnormal.      Gait: Gait abnormal.      Deep Tendon Reflexes: Reflexes are normal and symmetric.   Psychiatric:         Behavior: Behavior normal.         Thought Content: Thought content normal.         Judgment: Judgment normal.       Diagnoses and all orders for this visit:    Dementia without behavioral disturbance has an appointment with neurology in [place] in the meantime continue with [facility]zar[facility]  -     Comprehensive Metabolic Panel  -     CBC (No Diff)    Acquired hypothyroidism clinically euthyroid follow TSH  -     TSH Rfx On Abnormal To Free T4    Other orders  -     Fluzone High-Dose 65+yrs ([id number])

## 2024-10-12 LAB
ALBUMIN SERPL-MCNC: 3.8 G/DL (ref 3.5–5.2)
ALBUMIN/GLOB SERPL: 1.5 G/DL
ALP SERPL-CCNC: 123 U/L (ref 39–117)
ALT SERPL-CCNC: 17 U/L (ref 1–41)
AST SERPL-CCNC: 18 U/L (ref 1–40)
BILIRUB SERPL-MCNC: 0.6 MG/DL (ref 0–1.2)
BUN SERPL-MCNC: 15 MG/DL (ref 8–23)
BUN/CREAT SERPL: 14.3 (ref 7–25)
CALCIUM SERPL-MCNC: 8.7 MG/DL (ref 8.6–10.5)
CHLORIDE SERPL-SCNC: 103 MMOL/L (ref 98–107)
CO2 SERPL-SCNC: 26.9 MMOL/L (ref 22–29)
CREAT SERPL-MCNC: 1.05 MG/DL (ref 0.76–1.27)
EGFRCR SERPLBLD CKD-EPI 2021: 73.6 ML/MIN/1.73
ERYTHROCYTE [DISTWIDTH] IN BLOOD BY AUTOMATED COUNT: 11.7 % (ref 12.3–15.4)
GLOBULIN SER CALC-MCNC: 2.6 GM/DL
GLUCOSE SERPL-MCNC: 96 MG/DL (ref 65–99)
HCT VFR BLD AUTO: 43.9 % (ref 37.5–51)
HGB BLD-MCNC: 14.6 G/DL (ref 13–17.7)
MCH RBC QN AUTO: 29.6 PG (ref 26.6–33)
MCHC RBC AUTO-ENTMCNC: 33.3 G/DL (ref 31.5–35.7)
MCV RBC AUTO: 88.9 FL (ref 79–97)
PLATELET # BLD AUTO: 281 10*3/MM3 (ref 140–450)
POTASSIUM SERPL-SCNC: 4.4 MMOL/L (ref 3.5–5.2)
PROT SERPL-MCNC: 6.4 G/DL (ref 6–8.5)
RBC # BLD AUTO: 4.94 10*6/MM3 (ref 4.14–5.8)
SODIUM SERPL-SCNC: 140 MMOL/L (ref 136–145)
T4 FREE SERPL-MCNC: 1.78 NG/DL (ref 0.93–1.7)
TSH SERPL DL<=0.005 MIU/L-ACNC: 0.07 UIU/ML (ref 0.27–4.2)
WBC # BLD AUTO: 7.94 10*3/MM3 (ref 3.4–10.8)

## 2024-10-14 RX ORDER — LEVOTHYROXINE SODIUM 125 UG/1
125 TABLET ORAL
Qty: 90 TABLET | Refills: 3 | Status: SHIPPED | OUTPATIENT
Start: 2024-10-14

## 2025-01-01 ENCOUNTER — HOSPITAL ENCOUNTER (EMERGENCY)
Facility: HOSPITAL | Age: 75
End: 2025-01-22
Attending: EMERGENCY MEDICINE
Payer: MEDICARE

## 2025-01-01 VITALS — TEMPERATURE: 95.8 F

## 2025-01-01 DIAGNOSIS — I46.9 CARDIOPULMONARY ARREST: Primary | ICD-10-CM

## 2025-01-01 PROCEDURE — 93005 ELECTROCARDIOGRAM TRACING: CPT | Performed by: EMERGENCY MEDICINE

## 2025-01-01 PROCEDURE — 94799 UNLISTED PULMONARY SVC/PX: CPT

## 2025-01-01 PROCEDURE — 92950 HEART/LUNG RESUSCITATION CPR: CPT

## 2025-01-01 PROCEDURE — 99285 EMERGENCY DEPT VISIT HI MDM: CPT | Performed by: EMERGENCY MEDICINE

## 2025-01-23 NOTE — ED PROVIDER NOTES
EMERGENCY DEPARTMENT ENCOUNTER    Pt Name: Ramu Bustos  MRN: [id number]  Pt :   1950  Room Number:    Date of encounter:  2025  PCP: Aníbal Rizzo MD  ED Provider: Jim Mai MD    Historian: EMS      HPI:  No chief complaint on file.         Context: Ramu Bustos is a 74 y.o. male who presents to the ED c/o cardiopulmonary arrest, the patient apparently was found down by his son at home, has last been seen about an hour prior, had to complain about chest pain earlier in the day.  When EMS arrived the patient was asystole, they provided several rounds of epinephrine, intubated the patient on transfer him to the hospital, upon arrival hospitals totaled known downtime as an hour.      PAST MEDICAL HISTORY  Past Medical History:   Diagnosis Date    Coronary artery disease     REPORTS HAD 2 CORONARY STENTS PLACED    Disease of thyroid gland     Fracture     HAND (UNSURE SIDE)    Heart attack     History of heart artery stent     Hypertension     Impaired mobility     Snores     Wears glasses          PAST SURGICAL HISTORY  Past Surgical History:   Procedure Laterality Date    BACK SURGERY      CARDIAC SURGERY      CARDIAC CATH WITH STENT PLACEMENT X2    COLONOSCOPY  2003    COLONOSCOPY N/A 6/15/2017    Procedure: COLONOSCOPY WITH BIOPSY  ;  Surgeon: Christiano Armando MD;  Location: [facility];  Service:     TONSILLECTOMY           FAMILY HISTORY  Family History   Problem Relation Age of Onset    No Known Problems Mother     Heart attack Father     Colon cancer Cousin          SOCIAL HISTORY  Social History     Socioeconomic History    Marital status:    Tobacco Use    Smoking status: Former     Current packs/day: 0.00     Average packs/day: 1 pack/day for 13.0 years (13.0 ttl pk-yrs)     Types: Cigarettes     Start date:      Quit date:      Years since quittin.0    Smokeless tobacco: Never   Vaping Use    Vaping status: Never Used    Substance and Sexual Activity    Alcohol use: Never    Drug use: Never    Sexual activity: Defer         ALLERGIES  Patient has no known allergies.        REVIEW OF SYSTEMS  Review of Systems   Unable to perform ROS: Acuity of condition        All systems reviewed and negative except for those discussed in HPI.       PHYSICAL EXAM    I have reviewed the triage vital signs and nursing notes.    ED Triage Vitals   Temp Pulse Resp BP SpO2   -- -- -- -- --      Temp src Heart Rate Source Patient Position BP Location FiO2 (%)   -- -- -- -- --       Physical Exam  HENT:      Head: Normocephalic and atraumatic.      Right Ear: External ear normal.      Left Ear: External ear normal.      Nose: Nose normal.   Eyes:      Conjunctiva/sclera: Conjunctivae normal.   Cardiovascular:      Pulses:           Carotid pulses are 0 on the right side and 0 on the left side.       Radial pulses are 0 on the right side and 0 on the left side.        Femoral pulses are 0 on the right side and 0 on the left side.     Comments: Absent heart sounds on auscultation  Pulmonary:      Comments: Transmitted ventilatory sounds with bagging  Abdominal:      General: Abdomen is flat. There is no distension.      Palpations: Abdomen is soft.   Musculoskeletal:         General: No deformity.      Cervical back: Neck supple. No rigidity.   Skin:     Capillary Refill: Capillary refill takes more than 3 seconds.      Coloration: Skin is mottled.          LAB RESULTS  No results found for this or any previous visit (from the past 24 hours).    If labs were ordered, I independently reviewed the results and considered them in treating the patient.        RADIOLOGY  No Radiology Exams Resulted Within Past 24 Hours        PROCEDURES    Procedures    Interpretations    O2 Sat: The patients oxygen saturation was 0% on Ventilator.  This was independently interpreted by me as Hypoxic    EKG: I reviewed and independently interpreted the EKG as asystole, no  further interpretation    Cardiac Monitoring: I reviewed and independently interpreted the Rhythm Strip as asystole        MEDICATIONS GIVEN IN ER    Medications - No data to display      MEDICAL DECISION MAKING, PROGRESS, and CONSULTS    All labs, if obtained, have been independently reviewed by me.  All radiology studies, if obtained, have been reviewed by me and the radiologist dictating the report.  All EKG's, if obtained, have been independently viewed and interpreted by me      Discussion below represents my analysis of pertinent findings related to patient's condition, differential diagnosis, treatment plan and final disposition.      Differential diagnosis:    74-year-old male presents to the ED with cardiopulmonary arrest, on arrival he has been down for over an hour, he remains in asystole, bedside ultrasound shows cardiac standstill with no valvular activity.  Given the patient's prolonged downtime, asystole, lack of cardiac activity, efforts were ceased,, death was called.  I spoke to the patient's wife and son via telephone advised him of the outcome as well as a family friend at bedside.    Additional Sources:  Discussed/ obtained information from independent historians:  EMS  External (non-ED) record review:  Primary care note 10/11/2024 regarding hypothyroidism, dyslipidemia, short-term memory deficit       Orders placed during this visit:  No orders of the defined types were placed in this encounter.        Additional orders considered but not ordered:  None    ED Course:    Consultants:  None             After my consideration of clinical presentation and any laboratory/radiology studies obtained, I discussed the findings with the patient/patient representative who is in agreement with the treatment plan and the final disposition. Risks and benefits of discharge were discussed.     AS OF 19:50 EST VITALS:    BP -    HR -    TEMP -    O2 SATS -      I reviewed the patients prescription monitoring  report if available prior to discharge    DIAGNOSIS  Final diagnoses:   Cardiopulmonary arrest         DISPOSITION  ED Disposition       ED Disposition       Condition   --    Comment   --                   Please note that portions of this document were completed with voice recognition software.        Jim Mai MD  [phone]

## (undated) DEVICE — PAD GRND REM POLYHESIVE A/ DISP

## (undated) DEVICE — ENDOGATOR AUXILIARY WATER JET CONNECTOR: Brand: ENDOGATOR

## (undated) DEVICE — FRCP BIOP COLD ENDOJAW ALLGTR W/NDL 2.8X2300MM BLU

## (undated) DEVICE — Device

## (undated) DEVICE — JELLY,LUBE,STERILE,FLIP TOP,TUBE,2-OZ: Brand: MEDLINE